# Patient Record
Sex: MALE | Race: OTHER | HISPANIC OR LATINO
[De-identification: names, ages, dates, MRNs, and addresses within clinical notes are randomized per-mention and may not be internally consistent; named-entity substitution may affect disease eponyms.]

---

## 2017-01-10 ENCOUNTER — APPOINTMENT (OUTPATIENT)
Dept: CARDIOLOGY | Facility: CLINIC | Age: 62
End: 2017-01-10

## 2017-01-10 ENCOUNTER — NON-APPOINTMENT (OUTPATIENT)
Age: 62
End: 2017-01-10

## 2017-01-10 VITALS
SYSTOLIC BLOOD PRESSURE: 132 MMHG | HEIGHT: 68 IN | DIASTOLIC BLOOD PRESSURE: 74 MMHG | HEART RATE: 60 BPM | BODY MASS INDEX: 34.86 KG/M2 | WEIGHT: 230 LBS | OXYGEN SATURATION: 94 %

## 2017-01-17 ENCOUNTER — APPOINTMENT (OUTPATIENT)
Dept: PULMONOLOGY | Facility: CLINIC | Age: 62
End: 2017-01-17

## 2017-01-17 VITALS
SYSTOLIC BLOOD PRESSURE: 110 MMHG | OXYGEN SATURATION: 96 % | WEIGHT: 234 LBS | BODY MASS INDEX: 35.58 KG/M2 | DIASTOLIC BLOOD PRESSURE: 72 MMHG | HEART RATE: 76 BPM

## 2017-01-17 RX ORDER — ALBUTEROL SULFATE 90 UG/1
108 (90 BASE) AEROSOL, METERED RESPIRATORY (INHALATION)
Qty: 1 | Refills: 5 | Status: ACTIVE | COMMUNITY
Start: 2017-01-17 | End: 1900-01-01

## 2017-07-13 ENCOUNTER — APPOINTMENT (OUTPATIENT)
Dept: PULMONOLOGY | Facility: CLINIC | Age: 62
End: 2017-07-13

## 2017-07-13 VITALS
SYSTOLIC BLOOD PRESSURE: 118 MMHG | HEIGHT: 68 IN | OXYGEN SATURATION: 94 % | HEART RATE: 68 BPM | WEIGHT: 232 LBS | BODY MASS INDEX: 35.16 KG/M2 | DIASTOLIC BLOOD PRESSURE: 68 MMHG

## 2017-07-13 DIAGNOSIS — E66.01 MORBID (SEVERE) OBESITY DUE TO EXCESS CALORIES: ICD-10-CM

## 2017-08-01 ENCOUNTER — APPOINTMENT (OUTPATIENT)
Dept: CARDIOLOGY | Facility: CLINIC | Age: 62
End: 2017-08-01
Payer: COMMERCIAL

## 2017-08-01 VITALS
SYSTOLIC BLOOD PRESSURE: 129 MMHG | DIASTOLIC BLOOD PRESSURE: 80 MMHG | HEIGHT: 68 IN | OXYGEN SATURATION: 96 % | HEART RATE: 60 BPM

## 2017-08-01 DIAGNOSIS — R53.83 OTHER FATIGUE: ICD-10-CM

## 2017-08-01 PROCEDURE — 99214 OFFICE O/P EST MOD 30 MIN: CPT | Mod: 25

## 2017-08-01 PROCEDURE — 93306 TTE W/DOPPLER COMPLETE: CPT

## 2017-08-01 PROCEDURE — 93000 ELECTROCARDIOGRAM COMPLETE: CPT

## 2017-08-06 ENCOUNTER — NON-APPOINTMENT (OUTPATIENT)
Age: 62
End: 2017-08-06

## 2018-03-02 ENCOUNTER — EMERGENCY (EMERGENCY)
Facility: HOSPITAL | Age: 63
LOS: 1 days | Discharge: HOME | End: 2018-03-02
Attending: EMERGENCY MEDICINE | Admitting: EMERGENCY MEDICINE
Payer: COMMERCIAL

## 2018-03-02 VITALS
TEMPERATURE: 98 F | DIASTOLIC BLOOD PRESSURE: 87 MMHG | RESPIRATION RATE: 16 BRPM | SYSTOLIC BLOOD PRESSURE: 133 MMHG | OXYGEN SATURATION: 95 % | HEART RATE: 93 BPM

## 2018-03-02 VITALS — HEIGHT: 69 IN | WEIGHT: 229.94 LBS

## 2018-03-02 DIAGNOSIS — Z98.890 OTHER SPECIFIED POSTPROCEDURAL STATES: Chronic | ICD-10-CM

## 2018-03-02 LAB
ALBUMIN SERPL ELPH-MCNC: 4.3 G/DL — SIGNIFICANT CHANGE UP (ref 3.3–5.2)
ALP SERPL-CCNC: 53 U/L — SIGNIFICANT CHANGE UP (ref 40–120)
ALT FLD-CCNC: 27 U/L — SIGNIFICANT CHANGE UP
ANION GAP SERPL CALC-SCNC: 13 MMOL/L — SIGNIFICANT CHANGE UP (ref 5–17)
APPEARANCE UR: CLEAR — SIGNIFICANT CHANGE UP
APTT BLD: 26.5 SEC — LOW (ref 27.5–37.4)
AST SERPL-CCNC: 26 U/L — SIGNIFICANT CHANGE UP
BILIRUB SERPL-MCNC: 0.4 MG/DL — SIGNIFICANT CHANGE UP (ref 0.4–2)
BILIRUB UR-MCNC: NEGATIVE — SIGNIFICANT CHANGE UP
BLD GP AB SCN SERPL QL: SIGNIFICANT CHANGE UP
BUN SERPL-MCNC: 14 MG/DL — SIGNIFICANT CHANGE UP (ref 8–20)
CALCIUM SERPL-MCNC: 9.4 MG/DL — SIGNIFICANT CHANGE UP (ref 8.6–10.2)
CHLORIDE SERPL-SCNC: 100 MMOL/L — SIGNIFICANT CHANGE UP (ref 98–107)
CO2 SERPL-SCNC: 24 MMOL/L — SIGNIFICANT CHANGE UP (ref 22–29)
COLOR SPEC: YELLOW — SIGNIFICANT CHANGE UP
COMMENT - URINE: SIGNIFICANT CHANGE UP
CREAT SERPL-MCNC: 0.79 MG/DL — SIGNIFICANT CHANGE UP (ref 0.5–1.3)
DIFF PNL FLD: NEGATIVE — SIGNIFICANT CHANGE UP
GLUCOSE SERPL-MCNC: 140 MG/DL — HIGH (ref 70–115)
GLUCOSE UR QL: NEGATIVE MG/DL — SIGNIFICANT CHANGE UP
HCT VFR BLD CALC: 45.6 % — SIGNIFICANT CHANGE UP (ref 42–52)
HGB BLD-MCNC: 15.7 G/DL — SIGNIFICANT CHANGE UP (ref 14–18)
INR BLD: 1.14 RATIO — SIGNIFICANT CHANGE UP (ref 0.88–1.16)
KETONES UR-MCNC: NEGATIVE — SIGNIFICANT CHANGE UP
LEUKOCYTE ESTERASE UR-ACNC: NEGATIVE — SIGNIFICANT CHANGE UP
LIDOCAIN IGE QN: 38 U/L — SIGNIFICANT CHANGE UP (ref 22–51)
MCHC RBC-ENTMCNC: 29.1 PG — SIGNIFICANT CHANGE UP (ref 27–31)
MCHC RBC-ENTMCNC: 34.4 G/DL — SIGNIFICANT CHANGE UP (ref 32–36)
MCV RBC AUTO: 84.6 FL — SIGNIFICANT CHANGE UP (ref 80–94)
NITRITE UR-MCNC: NEGATIVE — SIGNIFICANT CHANGE UP
PH UR: 8 — SIGNIFICANT CHANGE UP (ref 5–8)
PLATELET # BLD AUTO: 216 K/UL — SIGNIFICANT CHANGE UP (ref 150–400)
POTASSIUM SERPL-MCNC: 4.1 MMOL/L — SIGNIFICANT CHANGE UP (ref 3.5–5.3)
POTASSIUM SERPL-SCNC: 4.1 MMOL/L — SIGNIFICANT CHANGE UP (ref 3.5–5.3)
PROT SERPL-MCNC: 7.7 G/DL — SIGNIFICANT CHANGE UP (ref 6.6–8.7)
PROT UR-MCNC: NEGATIVE MG/DL — SIGNIFICANT CHANGE UP
PROTHROM AB SERPL-ACNC: 12.6 SEC — SIGNIFICANT CHANGE UP (ref 9.8–12.7)
RBC # BLD: 5.39 M/UL — SIGNIFICANT CHANGE UP (ref 4.6–6.2)
RBC # FLD: 12.8 % — SIGNIFICANT CHANGE UP (ref 11–15.6)
SODIUM SERPL-SCNC: 137 MMOL/L — SIGNIFICANT CHANGE UP (ref 135–145)
SP GR SPEC: 1.01 — SIGNIFICANT CHANGE UP (ref 1.01–1.02)
TROPONIN T SERPL-MCNC: <0.01 NG/ML — SIGNIFICANT CHANGE UP (ref 0–0.06)
TYPE + AB SCN PNL BLD: SIGNIFICANT CHANGE UP
UROBILINOGEN FLD QL: NEGATIVE MG/DL — SIGNIFICANT CHANGE UP
WBC # BLD: 13 K/UL — HIGH (ref 4.8–10.8)
WBC # FLD AUTO: 13 K/UL — HIGH (ref 4.8–10.8)
WBC UR QL: SIGNIFICANT CHANGE UP

## 2018-03-02 PROCEDURE — 93010 ELECTROCARDIOGRAM REPORT: CPT

## 2018-03-02 PROCEDURE — 96374 THER/PROPH/DIAG INJ IV PUSH: CPT | Mod: XU

## 2018-03-02 PROCEDURE — 84484 ASSAY OF TROPONIN QUANT: CPT

## 2018-03-02 PROCEDURE — 74174 CTA ABD&PLVS W/CONTRAST: CPT

## 2018-03-02 PROCEDURE — 96375 TX/PRO/DX INJ NEW DRUG ADDON: CPT | Mod: XU

## 2018-03-02 PROCEDURE — 81001 URINALYSIS AUTO W/SCOPE: CPT

## 2018-03-02 PROCEDURE — 71275 CT ANGIOGRAPHY CHEST: CPT | Mod: 26

## 2018-03-02 PROCEDURE — 36415 COLL VENOUS BLD VENIPUNCTURE: CPT

## 2018-03-02 PROCEDURE — 87086 URINE CULTURE/COLONY COUNT: CPT

## 2018-03-02 PROCEDURE — 85730 THROMBOPLASTIN TIME PARTIAL: CPT

## 2018-03-02 PROCEDURE — 74174 CTA ABD&PLVS W/CONTRAST: CPT | Mod: 26

## 2018-03-02 PROCEDURE — 93005 ELECTROCARDIOGRAM TRACING: CPT

## 2018-03-02 PROCEDURE — 99284 EMERGENCY DEPT VISIT MOD MDM: CPT | Mod: 25

## 2018-03-02 PROCEDURE — 71275 CT ANGIOGRAPHY CHEST: CPT

## 2018-03-02 PROCEDURE — 85027 COMPLETE CBC AUTOMATED: CPT

## 2018-03-02 PROCEDURE — 86901 BLOOD TYPING SEROLOGIC RH(D): CPT

## 2018-03-02 PROCEDURE — 83690 ASSAY OF LIPASE: CPT

## 2018-03-02 PROCEDURE — 99285 EMERGENCY DEPT VISIT HI MDM: CPT | Mod: 25

## 2018-03-02 PROCEDURE — 85610 PROTHROMBIN TIME: CPT

## 2018-03-02 PROCEDURE — 86900 BLOOD TYPING SEROLOGIC ABO: CPT

## 2018-03-02 PROCEDURE — 80053 COMPREHEN METABOLIC PANEL: CPT

## 2018-03-02 PROCEDURE — 86850 RBC ANTIBODY SCREEN: CPT

## 2018-03-02 RX ORDER — MORPHINE SULFATE 50 MG/1
4 CAPSULE, EXTENDED RELEASE ORAL ONCE
Qty: 0 | Refills: 0 | Status: DISCONTINUED | OUTPATIENT
Start: 2018-03-02 | End: 2018-03-02

## 2018-03-02 RX ORDER — ONDANSETRON 8 MG/1
4 TABLET, FILM COATED ORAL ONCE
Qty: 0 | Refills: 0 | Status: COMPLETED | OUTPATIENT
Start: 2018-03-02 | End: 2018-03-02

## 2018-03-02 RX ORDER — HYDROMORPHONE HYDROCHLORIDE 2 MG/ML
0.5 INJECTION INTRAMUSCULAR; INTRAVENOUS; SUBCUTANEOUS ONCE
Qty: 0 | Refills: 0 | Status: DISCONTINUED | OUTPATIENT
Start: 2018-03-02 | End: 2018-03-02

## 2018-03-02 RX ORDER — SODIUM CHLORIDE 9 MG/ML
1000 INJECTION INTRAMUSCULAR; INTRAVENOUS; SUBCUTANEOUS ONCE
Qty: 0 | Refills: 0 | Status: COMPLETED | OUTPATIENT
Start: 2018-03-02 | End: 2018-03-02

## 2018-03-02 RX ADMIN — SODIUM CHLORIDE 1000 MILLILITER(S): 9 INJECTION INTRAMUSCULAR; INTRAVENOUS; SUBCUTANEOUS at 08:58

## 2018-03-02 RX ADMIN — MORPHINE SULFATE 4 MILLIGRAM(S): 50 CAPSULE, EXTENDED RELEASE ORAL at 12:36

## 2018-03-02 RX ADMIN — HYDROMORPHONE HYDROCHLORIDE 0.5 MILLIGRAM(S): 2 INJECTION INTRAMUSCULAR; INTRAVENOUS; SUBCUTANEOUS at 16:01

## 2018-03-02 RX ADMIN — MORPHINE SULFATE 4 MILLIGRAM(S): 50 CAPSULE, EXTENDED RELEASE ORAL at 12:49

## 2018-03-02 RX ADMIN — ONDANSETRON 4 MILLIGRAM(S): 8 TABLET, FILM COATED ORAL at 08:59

## 2018-03-02 NOTE — ED PROVIDER NOTE - OBJECTIVE STATEMENT
Patient is a 61 y/o male with a pmhx of asthma, HTN, HLD c/o of abdominal pain that started tonight at 2: 00 A.M. Patient admits pain is sharp and severe. Patient admits pain is radiating to mid back. Patient denies experiencing this before in the past. Patient admits to four episodes of vomiting (non-bloody, non-bilious). Patient denies taking medication for the pain. Patient denies fever, chills, diarrhea, chest pain, shortness of breath, palpitations, dysuria, hematuria.

## 2018-03-02 NOTE — ED ADULT NURSE NOTE - OBJECTIVE STATEMENT
62 yr old male a+ox4 presents to ED c/o epigastric pain radiating to the left abd and flank.  pt also reports n/v.  sx started at approx 0200 this morning.   last BM was 0300 this morning.  pt states he last ate a burrito last night from a restaurant.  pt denies sick contacts, fevers, chills, cp, sob.

## 2018-03-02 NOTE — ED PROVIDER NOTE - PHYSICAL EXAMINATION
General: Well appearing, sitting comfortably in stretcher Eyes: PERRLA, conjunctiva pink, sclera white bilaterally Cardio: Regular rate and rhythm, S1/S2, no murmurs Resp: Clear to auscultation bilaterally, no wheezes, rales or rhonchi Abd: Soft, tenderness in LUQ and RUQ on palpation, + BS : No CVA tenderness MSK: FROM in all extremities Skin: Warm, dry, without rashes

## 2018-03-02 NOTE — ED ADULT NURSE REASSESSMENT NOTE - NS ED NURSE REASSESS COMMENT FT1
Patient continues to rest in bed at this time with eyes closed, respirations are even and non labored. NAD Noted.

## 2018-03-02 NOTE — ED PROVIDER NOTE - MEDICAL DECISION MAKING DETAILS
The patient presents with L flank pain that radiates to epigastric and Lab and CT and EKG negative and the patient feels better with good PO intake.  Will DC home and f/u with PMD

## 2018-03-02 NOTE — ED ADULT NURSE REASSESSMENT NOTE - NS ED NURSE REASSESS COMMENT FT1
CT unable to complete exam, additional IV access needed. #20g placed to left upper arm, pt medicated per MD orders and transported to CT at this time. Pt in no apparent distress, respirations even and unlabored. Will continue to monitor and reassess.

## 2018-03-02 NOTE — ED PROVIDER NOTE - PROGRESS NOTE DETAILS
Patient signed out to dr. glass The patient appears well and eating well. No chest pain no abdominal pain currently. CT chest and abd negative.  Will DC home and f/u with PMD

## 2018-03-02 NOTE — ED PROVIDER NOTE - ATTENDING CONTRIBUTION TO CARE
Patient complaining of L flank pain that radiates to epigastric area. No CP, No SOB, No motor No sensory loss.  ABD soft, NT, Mild L flank TTP. CT negative.  Abdominal Pain NOS  I, Manuel Weiss, performed the initial face to face bedside interview with this patient regarding history of present illness, review of symptoms and relevant past medical, social and family history.  I completed an independent physical examination.  I was the initial provider who evaluated this patient. I have signed out the follow up of any pending tests (i.e. labs, radiological studies) to the ACP.  I have communicated the patient’s plan of care and disposition with the ACP.

## 2018-03-03 LAB
CULTURE RESULTS: NO GROWTH — SIGNIFICANT CHANGE UP
SPECIMEN SOURCE: SIGNIFICANT CHANGE UP

## 2018-04-13 ENCOUNTER — APPOINTMENT (OUTPATIENT)
Dept: CARDIOLOGY | Facility: CLINIC | Age: 63
End: 2018-04-13
Payer: COMMERCIAL

## 2018-04-13 ENCOUNTER — NON-APPOINTMENT (OUTPATIENT)
Age: 63
End: 2018-04-13

## 2018-04-13 VITALS
HEART RATE: 54 BPM | SYSTOLIC BLOOD PRESSURE: 127 MMHG | WEIGHT: 225 LBS | BODY MASS INDEX: 34.21 KG/M2 | OXYGEN SATURATION: 96 % | DIASTOLIC BLOOD PRESSURE: 76 MMHG

## 2018-04-13 DIAGNOSIS — E78.00 PURE HYPERCHOLESTEROLEMIA, UNSPECIFIED: ICD-10-CM

## 2018-04-13 DIAGNOSIS — R00.1 BRADYCARDIA, UNSPECIFIED: ICD-10-CM

## 2018-04-13 PROCEDURE — 99214 OFFICE O/P EST MOD 30 MIN: CPT

## 2018-04-13 PROCEDURE — 93000 ELECTROCARDIOGRAM COMPLETE: CPT

## 2018-05-11 ENCOUNTER — INPATIENT (INPATIENT)
Facility: HOSPITAL | Age: 63
LOS: 2 days | Discharge: HOME | DRG: 392 | End: 2018-05-14
Attending: INTERNAL MEDICINE | Admitting: HOSPITALIST
Payer: COMMERCIAL

## 2018-05-11 VITALS
DIASTOLIC BLOOD PRESSURE: 84 MMHG | SYSTOLIC BLOOD PRESSURE: 148 MMHG | OXYGEN SATURATION: 99 % | RESPIRATION RATE: 18 BRPM | HEART RATE: 60 BPM | TEMPERATURE: 98 F

## 2018-05-11 DIAGNOSIS — I10 ESSENTIAL (PRIMARY) HYPERTENSION: ICD-10-CM

## 2018-05-11 DIAGNOSIS — Z98.890 OTHER SPECIFIED POSTPROCEDURAL STATES: Chronic | ICD-10-CM

## 2018-05-11 DIAGNOSIS — R10.9 UNSPECIFIED ABDOMINAL PAIN: ICD-10-CM

## 2018-05-11 DIAGNOSIS — K80.51 CALCULUS OF BILE DUCT WITHOUT CHOLANGITIS OR CHOLECYSTITIS WITH OBSTRUCTION: ICD-10-CM

## 2018-05-11 DIAGNOSIS — R10.13 EPIGASTRIC PAIN: ICD-10-CM

## 2018-05-11 DIAGNOSIS — G47.33 OBSTRUCTIVE SLEEP APNEA (ADULT) (PEDIATRIC): ICD-10-CM

## 2018-05-11 LAB
APPEARANCE UR: CLEAR — SIGNIFICANT CHANGE UP
BASOPHILS # BLD AUTO: 0 K/UL — SIGNIFICANT CHANGE UP (ref 0–0.2)
BASOPHILS NFR BLD AUTO: 0.3 % — SIGNIFICANT CHANGE UP (ref 0–2)
BILIRUB UR-MCNC: NEGATIVE — SIGNIFICANT CHANGE UP
COLOR SPEC: YELLOW — SIGNIFICANT CHANGE UP
DIFF PNL FLD: NEGATIVE — SIGNIFICANT CHANGE UP
EOSINOPHIL # BLD AUTO: 0 K/UL — SIGNIFICANT CHANGE UP (ref 0–0.5)
EOSINOPHIL NFR BLD AUTO: 0 % — SIGNIFICANT CHANGE UP (ref 0–5)
GLUCOSE UR QL: NEGATIVE MG/DL — SIGNIFICANT CHANGE UP
HCT VFR BLD CALC: 45.6 % — SIGNIFICANT CHANGE UP (ref 42–52)
HGB BLD-MCNC: 15.4 G/DL — SIGNIFICANT CHANGE UP (ref 14–18)
KETONES UR-MCNC: NEGATIVE — SIGNIFICANT CHANGE UP
LEUKOCYTE ESTERASE UR-ACNC: NEGATIVE — SIGNIFICANT CHANGE UP
LYMPHOCYTES # BLD AUTO: 0.9 K/UL — LOW (ref 1–4.8)
LYMPHOCYTES # BLD AUTO: 7.9 % — LOW (ref 20–55)
MCHC RBC-ENTMCNC: 29.1 PG — SIGNIFICANT CHANGE UP (ref 27–31)
MCHC RBC-ENTMCNC: 33.8 G/DL — SIGNIFICANT CHANGE UP (ref 32–36)
MCV RBC AUTO: 86 FL — SIGNIFICANT CHANGE UP (ref 80–94)
MONOCYTES # BLD AUTO: 0.6 K/UL — SIGNIFICANT CHANGE UP (ref 0–0.8)
MONOCYTES NFR BLD AUTO: 5.2 % — SIGNIFICANT CHANGE UP (ref 3–10)
NEUTROPHILS # BLD AUTO: 10.2 K/UL — HIGH (ref 1.8–8)
NEUTROPHILS NFR BLD AUTO: 86.1 % — HIGH (ref 37–73)
NITRITE UR-MCNC: NEGATIVE — SIGNIFICANT CHANGE UP
PH UR: 7 — SIGNIFICANT CHANGE UP (ref 5–8)
PLATELET # BLD AUTO: 248 K/UL — SIGNIFICANT CHANGE UP (ref 150–400)
PROT UR-MCNC: NEGATIVE MG/DL — SIGNIFICANT CHANGE UP
RBC # BLD: 5.3 M/UL — SIGNIFICANT CHANGE UP (ref 4.6–6.2)
RBC # FLD: 13.4 % — SIGNIFICANT CHANGE UP (ref 11–15.6)
SP GR SPEC: 1.01 — SIGNIFICANT CHANGE UP (ref 1.01–1.02)
UROBILINOGEN FLD QL: NEGATIVE MG/DL — SIGNIFICANT CHANGE UP
WBC # BLD: 11.8 K/UL — HIGH (ref 4.8–10.8)
WBC # FLD AUTO: 11.8 K/UL — HIGH (ref 4.8–10.8)

## 2018-05-11 PROCEDURE — 99254 IP/OBS CNSLTJ NEW/EST MOD 60: CPT

## 2018-05-11 PROCEDURE — 99285 EMERGENCY DEPT VISIT HI MDM: CPT | Mod: 25

## 2018-05-11 PROCEDURE — 74177 CT ABD & PELVIS W/CONTRAST: CPT | Mod: 26

## 2018-05-11 PROCEDURE — 74022 RADEX COMPL AQT ABD SERIES: CPT | Mod: 26

## 2018-05-11 PROCEDURE — 76705 ECHO EXAM OF ABDOMEN: CPT | Mod: 26

## 2018-05-11 PROCEDURE — 99223 1ST HOSP IP/OBS HIGH 75: CPT

## 2018-05-11 RX ORDER — ENOXAPARIN SODIUM 100 MG/ML
40 INJECTION SUBCUTANEOUS EVERY 24 HOURS
Qty: 0 | Refills: 0 | Status: DISCONTINUED | OUTPATIENT
Start: 2018-05-11 | End: 2018-05-14

## 2018-05-11 RX ORDER — MONTELUKAST 4 MG/1
1 TABLET, CHEWABLE ORAL
Qty: 0 | Refills: 0 | COMMUNITY

## 2018-05-11 RX ORDER — SODIUM CHLORIDE 9 MG/ML
1000 INJECTION, SOLUTION INTRAVENOUS
Qty: 0 | Refills: 0 | Status: DISCONTINUED | OUTPATIENT
Start: 2018-05-11 | End: 2018-05-13

## 2018-05-11 RX ORDER — PANTOPRAZOLE SODIUM 20 MG/1
40 TABLET, DELAYED RELEASE ORAL ONCE
Qty: 0 | Refills: 0 | Status: COMPLETED | OUTPATIENT
Start: 2018-05-11 | End: 2018-05-11

## 2018-05-11 RX ORDER — THEOPHYLLINE ANHYDROUS 200 MG
1 TABLET, EXTENDED RELEASE 12 HR ORAL
Qty: 0 | Refills: 0 | COMMUNITY

## 2018-05-11 RX ORDER — HYDROMORPHONE HYDROCHLORIDE 2 MG/ML
1 INJECTION INTRAMUSCULAR; INTRAVENOUS; SUBCUTANEOUS ONCE
Qty: 0 | Refills: 0 | Status: DISCONTINUED | OUTPATIENT
Start: 2018-05-11 | End: 2018-05-11

## 2018-05-11 RX ORDER — ONDANSETRON 8 MG/1
4 TABLET, FILM COATED ORAL ONCE
Qty: 0 | Refills: 0 | Status: COMPLETED | OUTPATIENT
Start: 2018-05-11 | End: 2018-05-11

## 2018-05-11 RX ORDER — SODIUM CHLORIDE 9 MG/ML
500 INJECTION INTRAMUSCULAR; INTRAVENOUS; SUBCUTANEOUS ONCE
Qty: 0 | Refills: 0 | Status: COMPLETED | OUTPATIENT
Start: 2018-05-11 | End: 2018-05-11

## 2018-05-11 RX ORDER — LOSARTAN POTASSIUM 100 MG/1
1 TABLET, FILM COATED ORAL
Qty: 0 | Refills: 0 | COMMUNITY

## 2018-05-11 RX ORDER — HYDROMORPHONE HYDROCHLORIDE 2 MG/ML
0.5 INJECTION INTRAMUSCULAR; INTRAVENOUS; SUBCUTANEOUS EVERY 4 HOURS
Qty: 0 | Refills: 0 | Status: DISCONTINUED | OUTPATIENT
Start: 2018-05-11 | End: 2018-05-14

## 2018-05-11 RX ORDER — THEOPHYLLINE ANHYDROUS 200 MG
200 TABLET, EXTENDED RELEASE 12 HR ORAL DAILY
Qty: 0 | Refills: 0 | Status: DISCONTINUED | OUTPATIENT
Start: 2018-05-11 | End: 2018-05-14

## 2018-05-11 RX ORDER — MONTELUKAST 4 MG/1
10 TABLET, CHEWABLE ORAL DAILY
Qty: 0 | Refills: 0 | Status: DISCONTINUED | OUTPATIENT
Start: 2018-05-11 | End: 2018-05-14

## 2018-05-11 RX ORDER — LOSARTAN POTASSIUM 100 MG/1
100 TABLET, FILM COATED ORAL DAILY
Qty: 0 | Refills: 0 | Status: DISCONTINUED | OUTPATIENT
Start: 2018-05-11 | End: 2018-05-14

## 2018-05-11 RX ORDER — AMLODIPINE BESYLATE 2.5 MG/1
5 TABLET ORAL DAILY
Qty: 0 | Refills: 0 | Status: DISCONTINUED | OUTPATIENT
Start: 2018-05-11 | End: 2018-05-14

## 2018-05-11 RX ORDER — PANTOPRAZOLE SODIUM 20 MG/1
40 TABLET, DELAYED RELEASE ORAL
Qty: 0 | Refills: 0 | Status: DISCONTINUED | OUTPATIENT
Start: 2018-05-11 | End: 2018-05-14

## 2018-05-11 RX ORDER — DIPHENHYDRAMINE HCL 50 MG
25 CAPSULE ORAL ONCE
Qty: 0 | Refills: 0 | Status: COMPLETED | OUTPATIENT
Start: 2018-05-11 | End: 2018-05-11

## 2018-05-11 RX ORDER — AMLODIPINE BESYLATE 2.5 MG/1
1 TABLET ORAL
Qty: 0 | Refills: 0 | COMMUNITY

## 2018-05-11 RX ORDER — HYDROMORPHONE HYDROCHLORIDE 2 MG/ML
0.5 INJECTION INTRAMUSCULAR; INTRAVENOUS; SUBCUTANEOUS ONCE
Qty: 0 | Refills: 0 | Status: DISCONTINUED | OUTPATIENT
Start: 2018-05-11 | End: 2018-05-11

## 2018-05-11 RX ADMIN — Medication 125 MILLIGRAM(S): at 16:20

## 2018-05-11 RX ADMIN — PANTOPRAZOLE SODIUM 40 MILLIGRAM(S): 20 TABLET, DELAYED RELEASE ORAL at 07:51

## 2018-05-11 RX ADMIN — HYDROMORPHONE HYDROCHLORIDE 1 MILLIGRAM(S): 2 INJECTION INTRAMUSCULAR; INTRAVENOUS; SUBCUTANEOUS at 11:00

## 2018-05-11 RX ADMIN — Medication 25 MILLIGRAM(S): at 16:20

## 2018-05-11 RX ADMIN — Medication 200 MILLIGRAM(S): at 22:53

## 2018-05-11 RX ADMIN — HYDROMORPHONE HYDROCHLORIDE 1 MILLIGRAM(S): 2 INJECTION INTRAMUSCULAR; INTRAVENOUS; SUBCUTANEOUS at 12:10

## 2018-05-11 RX ADMIN — SODIUM CHLORIDE 125 MILLILITER(S): 9 INJECTION, SOLUTION INTRAVENOUS at 21:10

## 2018-05-11 RX ADMIN — ONDANSETRON 4 MILLIGRAM(S): 8 TABLET, FILM COATED ORAL at 07:52

## 2018-05-11 RX ADMIN — ENOXAPARIN SODIUM 40 MILLIGRAM(S): 100 INJECTION SUBCUTANEOUS at 21:30

## 2018-05-11 RX ADMIN — HYDROMORPHONE HYDROCHLORIDE 1 MILLIGRAM(S): 2 INJECTION INTRAMUSCULAR; INTRAVENOUS; SUBCUTANEOUS at 14:50

## 2018-05-11 RX ADMIN — HYDROMORPHONE HYDROCHLORIDE 0.5 MILLIGRAM(S): 2 INJECTION INTRAMUSCULAR; INTRAVENOUS; SUBCUTANEOUS at 10:27

## 2018-05-11 RX ADMIN — HYDROMORPHONE HYDROCHLORIDE 1 MILLIGRAM(S): 2 INJECTION INTRAMUSCULAR; INTRAVENOUS; SUBCUTANEOUS at 13:17

## 2018-05-11 RX ADMIN — HYDROMORPHONE HYDROCHLORIDE 0.5 MILLIGRAM(S): 2 INJECTION INTRAMUSCULAR; INTRAVENOUS; SUBCUTANEOUS at 07:52

## 2018-05-11 RX ADMIN — LOSARTAN POTASSIUM 100 MILLIGRAM(S): 100 TABLET, FILM COATED ORAL at 22:53

## 2018-05-11 RX ADMIN — AMLODIPINE BESYLATE 5 MILLIGRAM(S): 2.5 TABLET ORAL at 22:52

## 2018-05-11 RX ADMIN — MONTELUKAST 10 MILLIGRAM(S): 4 TABLET, CHEWABLE ORAL at 22:53

## 2018-05-11 NOTE — CONSULT NOTE ADULT - ASSESSMENT
Patient with abdominal pain. Unclear etiology. Cholelithiasis. Unlikely cholecystitis.    1. Start PPI therapy bid  2. May need EGD if does not improve  3. GI will follow up

## 2018-05-11 NOTE — H&P ADULT - HISTORY OF PRESENT ILLNESS
63 yr male with history of hypertension.  Presented sudden onset abdominal pains 1am associated with nausea vomiting. Patient took mylanta to no avail. In the morning 6am pain unremitting, localized in the epigastrium, patient came over to ED.  CT abd showing diffuse fatty infiltrates of the liver, diverticulosis without diverticulitis.  Abd ultrasound cholelithiasis without cholecystitis.  Leukocytosis.

## 2018-05-11 NOTE — ED ADULT NURSE NOTE - OBJECTIVE STATEMENT
patient is alert, report abdominal pain, epigastric and upper abdomen, vomiting. onset 1am this morning. Patient was here for similar issue 1 month ago.

## 2018-05-11 NOTE — CONSULT NOTE ADULT - SUBJECTIVE AND OBJECTIVE BOX
HPI:      PAST MEDICAL & SURGICAL HISTORY:  Sleep apnea  Aortic embolism or thrombosis  HTN (hypertension)  H/O cervical spine surgery      ROS:  No Heartburn, regurgitation, dysphagia, odynophagia.  No dyspepsia  No abdominal pain.    No Nausea, vomiting.  No Bleeding.  No hematemesis.   No diarrhea.    No hematochesia.  No weight loss, anorexia.  No edema.      MEDICATIONS  (STANDING):  HYDROmorphone  Injectable 1 milliGRAM(s) IV Push Once    MEDICATIONS  (PRN):      Allergies    Allergy Status Unknown    Intolerances        SOCIAL HISTORY:    ENDOSCOPIC/GI HISTORY:    FAMILY HISTORY:      Vital Signs Last 24 Hrs  T(C): 36.5 (11 May 2018 12:10), Max: 36.5 (11 May 2018 12:10)  T(F): 97.7 (11 May 2018 12:10), Max: 97.7 (11 May 2018 12:10)  HR: 75 (11 May 2018 12:10) (60 - 75)  BP: 136/76 (11 May 2018 12:10) (136/76 - 148/84)  BP(mean): --  RR: 18 (11 May 2018 12:10) (18 - 18)  SpO2: 99% (11 May 2018 12:10) (99% - 99%)    PHYSICAL EXAM:    GENERAL: NAD, well-groomed, well-developed  HEAD:  Atraumatic, Normocephalic  EYES: EOMI, PERRLA, conjunctiva and sclera clear  ENMT: No tonsillar erythema, exudates, or enlargement; Moist mucous membranes, Good dentition, No lesions  NECK: Supple, No JVD, Normal thyroid  CHEST/LUNG: Clear to percussion bilaterally; No rales, rhonchi, wheezing, or rubs  HEART: Regular rate and rhythm; No murmurs, rubs, or gallops  ABDOMEN: Soft, Nontender, Nondistended; Bowel sounds present  EXTREMITIES:  2+ Peripheral Pulses, No clubbing, cyanosis, or edema  LYMPH: No lymphadenopathy noted  SKIN: No rashes or lesions      LABS:                        15.4   11.8  )-----------( 248      ( 11 May 2018 07:56 )             45.6     05-11    141  |  104  |  15.0  ----------------------------<  149<H>  3.8   |  24.0  |  0.76    Ca    9.0      11 May 2018 08:57    TPro  7.5  /  Alb  4.3  /  TBili  0.5  /  DBili  x   /  AST  24  /  ALT  28  /  AlkPhos  51  05-11       Urinalysis Basic - ( 11 May 2018 10:51 )    Color: Yellow / Appearance: Clear / S.010 / pH: x  Gluc: x / Ketone: Negative  / Bili: Negative / Urobili: Negative mg/dL   Blood: x / Protein: Negative mg/dL / Nitrite: Negative   Leuk Esterase: Negative / RBC: x / WBC x   Sq Epi: x / Non Sq Epi: x / Bacteria: x        LIVER FUNCTIONS - ( 11 May 2018 08:57 )  Alb: 4.3 g/dL / Pro: 7.5 g/dL / ALK PHOS: 51 U/L / ALT: 28 U/L / AST: 24 U/L / GGT: x               RADIOLOGY & ADDITIONAL STUDIES:< from: US Gallbladder (18 @ 09:48) >      INTERPRETATION:  INDICATIONS:  Right upper quadrant pain.  TECHNIQUE:  The examination was performed with the real time apparatus   with color flow and spectral doppler imaging.    Examination limited to   the region of the gallbladder.  DATE AND TIME OF EXAM: 2018 9:28 AM.    COMPARISON EXAMINATION: No prior exam.    FINDINGS:    Gallbladder:  Cholelithiasis. No evidence of gallbladder dilatation..  Gallbladder Walls:  No evidence of thickening or edema.  Common Bile Duct:  Normal, measuring 3 mm.      IMPRESSION: Cholelithiasis without sonographic evidence of acute   cholecystitis..        < end of copied text > HPI:Patient with history of CAD, HTN, aortic aneursym, C spine surgery admitted with abdominal pain, nausea, and vomiting. He had similar abdominal pain in 2018 and underwent lab testing and also CT angio which was unremarkable. He woke up suddenly at 4 am and had epigastric pain, nausea, vomiting. No other complaints. No history of constipation. No rectal bleeding. No recent EGD or colonoscopy-about 4 year ago-normal. Not on any NSAIDs except aspirin.     Noted to have cholelithiasis. LFTs normal.       PAST MEDICAL & SURGICAL HISTORY:  Sleep apnea  Aortic embolism or thrombosis  HTN (hypertension)  H/O cervical spine surgery      ROS:  No Heartburn, regurgitation, dysphagia, odynophagia.  No dyspepsia  + abdominal pain.    + Nausea, vomiting.  No Bleeding.  No hematemesis.   No diarrhea.    No hematochezia  No weight loss, anorexia.  No edema.      MEDICATIONS  (STANDING):    MEDICATIONS  (PRN):      Allergies    Allergy Status Unknown    Intolerances        SOCIAL HISTORY:Denies x 3    ENDOSCOPIC/GI HISTORY: As in HPI    FAMILY HISTORY: No GI cancers      Vital Signs Last 24 Hrs  T(C): 36.1 (11 May 2018 18:59), Max: 36.5 (11 May 2018 12:10)  T(F): 97 (11 May 2018 18:59), Max: 97.7 (11 May 2018 12:10)  HR: 73 (11 May 2018 18:59) (60 - 75)  BP: 151/82 (11 May 2018 18:59) (136/76 - 151/82)  BP(mean): --  RR: 18 (11 May 2018 18:59) (18 - 18)  SpO2: 97% (11 May 2018 18:59) (97% - 99%)    PHYSICAL EXAM:    GENERAL: NAD, well-groomed, well-developed  HEAD:  Atraumatic, Normocephalic  EYES: EOMI, PERRLA, conjunctiva and sclera clear  ENMT: No tonsillar erythema, exudates, or enlargement; Moist mucous membranes, Good dentition, No lesions  NECK: Supple, No JVD, Normal thyroid  CHEST/LUNG: Clear to percussion bilaterally; No rales, rhonchi, wheezing, or rubs  HEART: Regular rate and rhythm; No murmurs, rubs, or gallops  ABDOMEN: Soft, Nontender, Nondistended; Bowel sounds present  RECTAL: No blood  EXTREMITIES:  2+ Peripheral Pulses, No clubbing, cyanosis, or edema  LYMPH: No lymphadenopathy noted  SKIN: No rashes or lesions      LABS:                        15.4   11.8  )-----------( 248      ( 11 May 2018 07:56 )             45.6         141  |  104  |  15.0  ----------------------------<  149<H>  3.8   |  24.0  |  0.76    Ca    9.0      11 May 2018 08:57    TPro  7.5  /  Alb  4.3  /  TBili  0.5  /  DBili  x   /  AST  24  /  ALT  28  /  AlkPhos  51  0511       Urinalysis Basic - ( 11 May 2018 10:51 )    Color: Yellow / Appearance: Clear / S.010 / pH: x  Gluc: x / Ketone: Negative  / Bili: Negative / Urobili: Negative mg/dL   Blood: x / Protein: Negative mg/dL / Nitrite: Negative   Leuk Esterase: Negative / RBC: x / WBC x   Sq Epi: x / Non Sq Epi: x / Bacteria: x    Lipase, Serum (18 @ 08:57)    Lipase, Serum: 41 U/L        LIVER FUNCTIONS - ( 11 May 2018 08:57 )  Alb: 4.3 g/dL / Pro: 7.5 g/dL / ALK PHOS: 51 U/L / ALT: 28 U/L / AST: 24 U/L / GGT: x               RADIOLOGY & ADDITIONAL STUDIES:  < from: CT Abdomen and Pelvis w/ Oral Cont and w/ IV Cont (18 @ 15:19) >    TECHNIQUE:  Sections were obtained from the diaphragm to the pubic   symphysis following oral and intravenous contrast.     95 mls of   omnipaque 300 was administered intravenously without complication.    COMPARISON EXAMINATION:  No prior exam.      FINDINGS:  LUNG BASES:  Unremarkable.   LIVER:  Diffuse fatty infiltration. The gallbladder is unremarkable..  SPLEEN:   Unremarkable.  PANCREAS:  The pancreatic contour is unremarkable without evidence of   mass, inflammation or ductal dilatation..  ADRENAL GLANDS:   Unremarkable.  KIDNEYS:   3 mm right lower pole stone. No evidence of right   hydronephrosis. The left kidney is unremarkable..  AORTA:   Unremarkable.  RETROPERITONEUM:  Under marked.  PELVIC SIDE WALL:   Unremarkable.  GASTROINTESTINAL STRUCTURES:  Colonic diverticulosis. The appendix is   visualized and is normal..  BLADDER:   Unremarkable.  PELVIC ORGANS:   Unremarkable.  BONES:  Degenerative changes.  MISCELLANEOUS: Small umbilical hernia.    IMPRESSION:     Diffuse fatty infiltration of the liver.    3 mm right lower pole renal stone without evidence of hydronephrosis.    Colonic diverticulosis without evidence of diverticulitis.    Small umbilical hernia..     < end of copied text >

## 2018-05-11 NOTE — ED PROVIDER NOTE - OBJECTIVE STATEMENT
64 y/o M pt with a pmhx asthma , htn and aortic thrombosis presents to the ED c/o nausea, vomiting and abdominal pain that onset at 0100 this AM. Localizes pain to the upper abdomen. Rates the pain 10/10.  Last food intake was pasta with "stewed" pork. Came to [facility] ED 2 months ago for similar symptoms where he had a negative CT of chest and abdomen. Did not f/u because the pain subsided and the symptoms did not return. Describes the pain this event similar to his event in the past. Denies cough, sob, Hx of ulcers, back pain, diarrhea, fever, chills, headache, dysuria/frequency/urgency, neck pain, chest pain, recent travel, recent trauma, rash or any other complaints. NKDA.

## 2018-05-11 NOTE — ED PROVIDER NOTE - PROGRESS NOTE DETAILS
pt has worsening pain in upper abdomen. GI consult called. PT TI HAVE REPEAT ct scan pt had mild allergic reaction to dilaudid and was given benadryl and solumedrol and will be admitted for further work-up

## 2018-05-11 NOTE — ED ADULT NURSE REASSESSMENT NOTE - NS ED NURSE REASSESS COMMENT FT1
Report received from offgoing rn, chart as noted, pt a&ox3 #20g iv noted to right ac, site asmyp, rr even and unlabored. Pt reports pain 4/10, relaxation promoted for pain control, pt practicing prayer for pain relief with family @ bedside. VSS per fs, safety maintained, updated on poc, verbalized understanding of npo @ midnight status, in no apparent distress @ this time

## 2018-05-11 NOTE — H&P ADULT - NSHPPHYSICALEXAM_GEN_ALL_CORE
Well nourished obese  Normocephalic  EOMI PERRL  Neck supple no JVD  S1 and S2 regular   Chest CTA B  Abd soft mild epigastric tenderness. NO rebound. No palpable masses  No calf tenderness , no pedal edema.   AAO X 3 no focal neurologic deficit   No skin rash , no skin eruption  Normal mood and affect

## 2018-05-11 NOTE — ED PROVIDER NOTE - MEDICAL DECISION MAKING DETAILS
Pt with possible gall stones vs gastritis, will give protonic, zofran, pain medications. Obtain Us, xrays and EKG.

## 2018-05-12 LAB
ANION GAP SERPL CALC-SCNC: 15 MMOL/L — SIGNIFICANT CHANGE UP (ref 5–17)
BUN SERPL-MCNC: 12 MG/DL — SIGNIFICANT CHANGE UP (ref 8–20)
CALCIUM SERPL-MCNC: 8.9 MG/DL — SIGNIFICANT CHANGE UP (ref 8.6–10.2)
CHLORIDE SERPL-SCNC: 101 MMOL/L — SIGNIFICANT CHANGE UP (ref 98–107)
CO2 SERPL-SCNC: 23 MMOL/L — SIGNIFICANT CHANGE UP (ref 22–29)
CREAT SERPL-MCNC: 0.74 MG/DL — SIGNIFICANT CHANGE UP (ref 0.5–1.3)
CULTURE RESULTS: SIGNIFICANT CHANGE UP
GLUCOSE SERPL-MCNC: 134 MG/DL — HIGH (ref 70–115)
HCT VFR BLD CALC: 43 % — SIGNIFICANT CHANGE UP (ref 42–52)
HGB BLD-MCNC: 15 G/DL — SIGNIFICANT CHANGE UP (ref 14–18)
MCHC RBC-ENTMCNC: 29.7 PG — SIGNIFICANT CHANGE UP (ref 27–31)
MCHC RBC-ENTMCNC: 34.9 G/DL — SIGNIFICANT CHANGE UP (ref 32–36)
MCV RBC AUTO: 85.1 FL — SIGNIFICANT CHANGE UP (ref 80–94)
PLATELET # BLD AUTO: 225 K/UL — SIGNIFICANT CHANGE UP (ref 150–400)
POTASSIUM SERPL-MCNC: 4.3 MMOL/L — SIGNIFICANT CHANGE UP (ref 3.5–5.3)
POTASSIUM SERPL-SCNC: 4.3 MMOL/L — SIGNIFICANT CHANGE UP (ref 3.5–5.3)
RBC # BLD: 5.05 M/UL — SIGNIFICANT CHANGE UP (ref 4.6–6.2)
RBC # FLD: 13.1 % — SIGNIFICANT CHANGE UP (ref 11–15.6)
SODIUM SERPL-SCNC: 139 MMOL/L — SIGNIFICANT CHANGE UP (ref 135–145)
SPECIMEN SOURCE: SIGNIFICANT CHANGE UP
WBC # BLD: 15.5 K/UL — HIGH (ref 4.8–10.8)
WBC # FLD AUTO: 15.5 K/UL — HIGH (ref 4.8–10.8)

## 2018-05-12 PROCEDURE — 99232 SBSQ HOSP IP/OBS MODERATE 35: CPT

## 2018-05-12 RX ADMIN — AMLODIPINE BESYLATE 5 MILLIGRAM(S): 2.5 TABLET ORAL at 21:14

## 2018-05-12 RX ADMIN — ENOXAPARIN SODIUM 40 MILLIGRAM(S): 100 INJECTION SUBCUTANEOUS at 21:14

## 2018-05-12 RX ADMIN — PANTOPRAZOLE SODIUM 40 MILLIGRAM(S): 20 TABLET, DELAYED RELEASE ORAL at 06:22

## 2018-05-12 RX ADMIN — Medication 200 MILLIGRAM(S): at 21:14

## 2018-05-12 RX ADMIN — LOSARTAN POTASSIUM 100 MILLIGRAM(S): 100 TABLET, FILM COATED ORAL at 21:14

## 2018-05-12 RX ADMIN — PANTOPRAZOLE SODIUM 40 MILLIGRAM(S): 20 TABLET, DELAYED RELEASE ORAL at 18:10

## 2018-05-12 RX ADMIN — MONTELUKAST 10 MILLIGRAM(S): 4 TABLET, CHEWABLE ORAL at 21:14

## 2018-05-12 RX ADMIN — SODIUM CHLORIDE 125 MILLILITER(S): 9 INJECTION, SOLUTION INTRAVENOUS at 23:33

## 2018-05-12 RX ADMIN — SODIUM CHLORIDE 125 MILLILITER(S): 9 INJECTION, SOLUTION INTRAVENOUS at 05:00

## 2018-05-13 ENCOUNTER — TRANSCRIPTION ENCOUNTER (OUTPATIENT)
Age: 63
End: 2018-05-13

## 2018-05-13 LAB
ALBUMIN SERPL ELPH-MCNC: 4 G/DL — SIGNIFICANT CHANGE UP (ref 3.3–5.2)
ALP SERPL-CCNC: 48 U/L — SIGNIFICANT CHANGE UP (ref 40–120)
ALT FLD-CCNC: 28 U/L — SIGNIFICANT CHANGE UP
ANION GAP SERPL CALC-SCNC: 13 MMOL/L — SIGNIFICANT CHANGE UP (ref 5–17)
AST SERPL-CCNC: 26 U/L — SIGNIFICANT CHANGE UP
BILIRUB SERPL-MCNC: 0.5 MG/DL — SIGNIFICANT CHANGE UP (ref 0.4–2)
BUN SERPL-MCNC: 16 MG/DL — SIGNIFICANT CHANGE UP (ref 8–20)
CALCIUM SERPL-MCNC: 8.6 MG/DL — SIGNIFICANT CHANGE UP (ref 8.6–10.2)
CHLORIDE SERPL-SCNC: 101 MMOL/L — SIGNIFICANT CHANGE UP (ref 98–107)
CO2 SERPL-SCNC: 24 MMOL/L — SIGNIFICANT CHANGE UP (ref 22–29)
CREAT SERPL-MCNC: 0.77 MG/DL — SIGNIFICANT CHANGE UP (ref 0.5–1.3)
GLUCOSE SERPL-MCNC: 97 MG/DL — SIGNIFICANT CHANGE UP (ref 70–115)
HCT VFR BLD CALC: 44.5 % — SIGNIFICANT CHANGE UP (ref 42–52)
HGB BLD-MCNC: 14.9 G/DL — SIGNIFICANT CHANGE UP (ref 14–18)
MCHC RBC-ENTMCNC: 28.4 PG — SIGNIFICANT CHANGE UP (ref 27–31)
MCHC RBC-ENTMCNC: 33.5 G/DL — SIGNIFICANT CHANGE UP (ref 32–36)
MCV RBC AUTO: 84.9 FL — SIGNIFICANT CHANGE UP (ref 80–94)
PLATELET # BLD AUTO: 239 K/UL — SIGNIFICANT CHANGE UP (ref 150–400)
POTASSIUM SERPL-MCNC: 4.1 MMOL/L — SIGNIFICANT CHANGE UP (ref 3.5–5.3)
POTASSIUM SERPL-SCNC: 4.1 MMOL/L — SIGNIFICANT CHANGE UP (ref 3.5–5.3)
PROT SERPL-MCNC: 7 G/DL — SIGNIFICANT CHANGE UP (ref 6.6–8.7)
RBC # BLD: 5.24 M/UL — SIGNIFICANT CHANGE UP (ref 4.6–6.2)
RBC # FLD: 13.4 % — SIGNIFICANT CHANGE UP (ref 11–15.6)
SODIUM SERPL-SCNC: 138 MMOL/L — SIGNIFICANT CHANGE UP (ref 135–145)
WBC # BLD: 11.2 K/UL — HIGH (ref 4.8–10.8)
WBC # FLD AUTO: 11.2 K/UL — HIGH (ref 4.8–10.8)

## 2018-05-13 PROCEDURE — 99232 SBSQ HOSP IP/OBS MODERATE 35: CPT

## 2018-05-13 RX ADMIN — MONTELUKAST 10 MILLIGRAM(S): 4 TABLET, CHEWABLE ORAL at 21:13

## 2018-05-13 RX ADMIN — AMLODIPINE BESYLATE 5 MILLIGRAM(S): 2.5 TABLET ORAL at 21:13

## 2018-05-13 RX ADMIN — PANTOPRAZOLE SODIUM 40 MILLIGRAM(S): 20 TABLET, DELAYED RELEASE ORAL at 06:26

## 2018-05-13 RX ADMIN — PANTOPRAZOLE SODIUM 40 MILLIGRAM(S): 20 TABLET, DELAYED RELEASE ORAL at 17:03

## 2018-05-13 RX ADMIN — LOSARTAN POTASSIUM 100 MILLIGRAM(S): 100 TABLET, FILM COATED ORAL at 21:13

## 2018-05-13 RX ADMIN — Medication 200 MILLIGRAM(S): at 21:13

## 2018-05-14 ENCOUNTER — RESULT REVIEW (OUTPATIENT)
Age: 63
End: 2018-05-14

## 2018-05-14 ENCOUNTER — TRANSCRIPTION ENCOUNTER (OUTPATIENT)
Age: 63
End: 2018-05-14

## 2018-05-14 VITALS
RESPIRATION RATE: 18 BRPM | SYSTOLIC BLOOD PRESSURE: 129 MMHG | HEART RATE: 67 BPM | TEMPERATURE: 98 F | DIASTOLIC BLOOD PRESSURE: 81 MMHG | OXYGEN SATURATION: 97 %

## 2018-05-14 PROCEDURE — 76705 ECHO EXAM OF ABDOMEN: CPT

## 2018-05-14 PROCEDURE — 43239 EGD BIOPSY SINGLE/MULTIPLE: CPT

## 2018-05-14 PROCEDURE — 88305 TISSUE EXAM BY PATHOLOGIST: CPT

## 2018-05-14 PROCEDURE — 96375 TX/PRO/DX INJ NEW DRUG ADDON: CPT

## 2018-05-14 PROCEDURE — 88342 IMHCHEM/IMCYTCHM 1ST ANTB: CPT

## 2018-05-14 PROCEDURE — 99285 EMERGENCY DEPT VISIT HI MDM: CPT | Mod: 25

## 2018-05-14 PROCEDURE — 74022 RADEX COMPL AQT ABD SERIES: CPT

## 2018-05-14 PROCEDURE — 83690 ASSAY OF LIPASE: CPT

## 2018-05-14 PROCEDURE — 88342 IMHCHEM/IMCYTCHM 1ST ANTB: CPT | Mod: 26

## 2018-05-14 PROCEDURE — 87086 URINE CULTURE/COLONY COUNT: CPT

## 2018-05-14 PROCEDURE — 99239 HOSP IP/OBS DSCHRG MGMT >30: CPT

## 2018-05-14 PROCEDURE — 96376 TX/PRO/DX INJ SAME DRUG ADON: CPT

## 2018-05-14 PROCEDURE — 96374 THER/PROPH/DIAG INJ IV PUSH: CPT | Mod: XU

## 2018-05-14 PROCEDURE — 81003 URINALYSIS AUTO W/O SCOPE: CPT

## 2018-05-14 PROCEDURE — 74177 CT ABD & PELVIS W/CONTRAST: CPT

## 2018-05-14 PROCEDURE — 88305 TISSUE EXAM BY PATHOLOGIST: CPT | Mod: 26

## 2018-05-14 PROCEDURE — 93005 ELECTROCARDIOGRAM TRACING: CPT

## 2018-05-14 PROCEDURE — 36415 COLL VENOUS BLD VENIPUNCTURE: CPT

## 2018-05-14 PROCEDURE — 80053 COMPREHEN METABOLIC PANEL: CPT

## 2018-05-14 PROCEDURE — 85027 COMPLETE CBC AUTOMATED: CPT

## 2018-05-14 PROCEDURE — 80048 BASIC METABOLIC PNL TOTAL CA: CPT

## 2018-05-14 RX ORDER — PANTOPRAZOLE SODIUM 20 MG/1
40 TABLET, DELAYED RELEASE ORAL
Qty: 0 | Refills: 0 | Status: DISCONTINUED | OUTPATIENT
Start: 2018-05-14 | End: 2018-05-14

## 2018-05-14 RX ORDER — ALBUTEROL 90 UG/1
1 AEROSOL, METERED ORAL
Qty: 0 | Refills: 0 | COMMUNITY

## 2018-05-14 RX ORDER — ALBUTEROL 90 UG/1
0 AEROSOL, METERED ORAL
Qty: 0 | Refills: 0 | COMMUNITY

## 2018-05-14 RX ORDER — PANTOPRAZOLE SODIUM 20 MG/1
1 TABLET, DELAYED RELEASE ORAL
Qty: 30 | Refills: 0 | OUTPATIENT
Start: 2018-05-14

## 2018-05-14 RX ADMIN — PANTOPRAZOLE SODIUM 40 MILLIGRAM(S): 20 TABLET, DELAYED RELEASE ORAL at 05:22

## 2018-05-14 NOTE — PROGRESS NOTE ADULT - PROBLEM SELECTOR PROBLEM 4
Calculus of bile duct without cholecystitis with obstruction

## 2018-05-14 NOTE — DISCHARGE NOTE ADULT - PLAN OF CARE
stable for discharge It is important to see your primary physician as well as the physicians noted below within the next week to perform a comprehensive medical review.  Call their offices for an appointment as soon as you leave the hospital.  If you do not have a primary physician, contact the [facility] [place] [phone] located on [address] [place] [address].  Your medical issues appear to be stable at this time, but if your symptoms recur or worsen, contact your physicians and/or return to the hospital if necessary.  If you encounter any issues or questions with your medication, call your physicians before stopping the medication.  Do not drive.  Limit your diet to 2 grams of sodium daily.

## 2018-05-14 NOTE — DISCHARGE NOTE ADULT - SECONDARY DIAGNOSIS.
Calculus of bile duct without cholecystitis with obstruction Essential hypertension HTN (hypertension) Obstructive sleep apnea syndrome

## 2018-05-14 NOTE — BRIEF OPERATIVE NOTE - PROCEDURE
<<-----Click on this checkbox to enter Procedure EGD with biopsy  05/14/2018    Active  [facility]

## 2018-05-14 NOTE — PROGRESS NOTE ADULT - SUBJECTIVE AND OBJECTIVE BOX
Patient: VELMA RICHMOND [id number] 63y Male                           Internal Medicine Hospitalist Progress Note  Chief Complaint: Patient is a 63y old  Male who presents with a chief complaint of abd pains (11 May 2018 19:13)    HPI:  63 yr male with history of hypertension.  Presented sudden onset abdominal pains 1am associated with nausea vomiting. Patient took mylanta to no avail. In the morning 6am pain unremitting, localized in the epigastrium, patient came over to ED.  CT abd showing diffuse fatty infiltrates of the liver, diverticulosis without diverticulitis.  Abd ultrasound cholelithiasis without cholecystitis.  Pain has resolved with PPI therapy.  Had episodic diarrhea with resumption of po intake, now resolved.    Today, denies complaints.  No abdominal pain.  1 BM over past 24h.  no n/v.  No additional complaints. awaiting EGD.  Wife and neighbor at bedside.    ____________________PHYSICAL EXAM:  Vitals reviewed as indicated below  GENERAL:  NAD Alert and Oriented x 3   HEENT: NCAT  CARDIOVASCULAR:  S1, S2  LUNGS: CTAB  ABDOMEN:  soft, (-) tenderness, (-) distension, (+) bowel sounds, (-) guarding, (-) rebound (-) rigidity  EXTREMITIES:  no cyanosis / clubbing / edema.   ____________________    VITALS:  Vital Signs Last 24 Hrs  T(C): 36.6 (14 May 2018 08:10), Max: 36.8 (14 May 2018 00:49)  T(F): 97.8 (14 May 2018 08:10), Max: 98.2 (14 May 2018 00:49)  HR: 67 (14 May 2018 08:10) (56 - 67)  BP: 129/81 (14 May 2018 08:10) (102/54 - 132/62)  BP(mean): --  RR: 18 (14 May 2018 08:10) (16 - 18)  SpO2: 97% (14 May 2018 08:10) (97% - 97%) Daily     Daily   CAPILLARY BLOOD GLUCOSE        I&O's Summary    13 May 2018 07:01  -  14 May 2018 07:00  --------------------------------------------------------  IN: 1000 mL / OUT: 0 mL / NET: 1000 mL        LABS:                        14.9   11.2  )-----------( 239      ( 13 May 2018 13:31 )             44.5     05-13    138  |  101  |  16.0  ----------------------------<  97  4.1   |  24.0  |  0.77    Ca    8.6      13 May 2018 13:31    TPro  7.0  /  Alb  4.0  /  TBili  0.5  /  DBili  x   /  AST  26  /  ALT  28  /  AlkPhos  48  05-13      LIVER FUNCTIONS - ( 13 May 2018 13:31 )  Alb: 4.0 g/dL / Pro: 7.0 g/dL / ALK PHOS: 48 U/L / ALT: 28 U/L / AST: 26 U/L / GGT: x                   MEDICATIONS:  amLODIPine   Tablet 5 milliGRAM(s) Oral daily  enoxaparin Injectable 40 milliGRAM(s) SubCutaneous every 24 hours  HYDROmorphone  Injectable 0.5 milliGRAM(s) IV Push every 4 hours PRN  losartan 100 milliGRAM(s) Oral daily  montelukast 10 milliGRAM(s) Oral daily  pantoprazole  Injectable 40 milliGRAM(s) IV Push two times a day  theophylline ER Capsule 200 milliGRAM(s) Oral daily
INTERVAL HPI/OVERNIGHT EVENTS:Follow for abdominal pain. The patient is doing much better at this time. He is denying any abdominal pain. He is on PPI twice daily. He is tolerating a diet.      MEDICATIONS  (STANDING):  amLODIPine   Tablet 5 milliGRAM(s) Oral daily  enoxaparin Injectable 40 milliGRAM(s) SubCutaneous every 24 hours  losartan 100 milliGRAM(s) Oral daily  montelukast 10 milliGRAM(s) Oral daily  pantoprazole  Injectable 40 milliGRAM(s) IV Push two times a day  sodium chloride 0.45%. 1000 milliLiter(s) (125 mL/Hr) IV Continuous <Continuous>  theophylline ER Capsule 200 milliGRAM(s) Oral daily    MEDICATIONS  (PRN):  HYDROmorphone  Injectable 0.5 milliGRAM(s) IV Push every 4 hours PRN Severe Pain (7 - 10)      Allergies    No Known Allergies    Intolerances        Vital Signs Last 24 Hrs  T(C): 36.6 (13 May 2018 07:45), Max: 37.2 (12 May 2018 17:03)  T(F): 97.9 (13 May 2018 07:45), Max: 98.9 (12 May 2018 17:03)  HR: 66 (13 May 2018 07:45) (58 - 66)  BP: 132/88 (13 May 2018 07:45) (132/88 - 140/68)  BP(mean): --  RR: 18 (13 May 2018 07:45) (16 - 18)  SpO2: 96% (13 May 2018 07:45) (94% - 96%)    LABS:                        15.0   15.5  )-----------( 225      ( 12 May 2018 08:28 )             43.0     05-12    139  |  101  |  12.0  ----------------------------<  134<H>  4.3   |  23.0  |  0.74    Ca    8.9      12 May 2018 08:28            RADIOLOGY & ADDITIONAL TESTS:< from: CT Abdomen and Pelvis w/ Oral Cont and w/ IV Cont (05.11.18 @ 15:19) >    FINDINGS:  LUNG BASES:  Unremarkable.   LIVER:  Diffuse fatty infiltration. The gallbladder is unremarkable..  SPLEEN:   Unremarkable.  PANCREAS:  The pancreatic contour is unremarkable without evidence of   mass, inflammation or ductal dilatation..  ADRENAL GLANDS:   Unremarkable.  KIDNEYS:   3 mm right lower pole stone. No evidence of right   hydronephrosis. The left kidney is unremarkable..  AORTA:   Unremarkable.  RETROPERITONEUM:  Under marked.  PELVIC SIDE WALL:   Unremarkable.  GASTROINTESTINAL STRUCTURES:  Colonic diverticulosis. The appendix is   visualized and is normal..  BLADDER:   Unremarkable.  PELVIC ORGANS:   Unremarkable.  BONES:  Degenerative changes.  MISCELLANEOUS: Small umbilical hernia.    IMPRESSION:     Diffuse fatty infiltration of the liver.    3 mm right lower pole renal stone without evidence of hydronephrosis.    Colonic diverticulosis without evidence of diverticulitis.    Small umbilical hernia..     < end of copied text >
INTERVAL HPI/OVERNIGHT EVENTS:Patient follow up for abdominal pain. No complaints today. On PPI bid.     MEDICATIONS  (STANDING):  amLODIPine   Tablet 5 milliGRAM(s) Oral daily  enoxaparin Injectable 40 milliGRAM(s) SubCutaneous every 24 hours  losartan 100 milliGRAM(s) Oral daily  montelukast 10 milliGRAM(s) Oral daily  pantoprazole  Injectable 40 milliGRAM(s) IV Push two times a day  sodium chloride 0.45%. 1000 milliLiter(s) (125 mL/Hr) IV Continuous <Continuous>  theophylline ER Capsule 200 milliGRAM(s) Oral daily    MEDICATIONS  (PRN):  HYDROmorphone  Injectable 0.5 milliGRAM(s) IV Push every 4 hours PRN Severe Pain (7 - 10)      Allergies    No Known Allergies    Intolerances        Vital Signs Last 24 Hrs  T(C): 36.8 (12 May 2018 07:30), Max: 37.2 (11 May 2018 21:00)  T(F): 98.2 (12 May 2018 07:30), Max: 98.9 (11 May 2018 21:00)  HR: 70 (12 May 2018 07:30) (70 - 95)  BP: 137/77 (12 May 2018 07:30) (136/76 - 160/92)  BP(mean): --  RR: 18 (12 May 2018 07:30) (18 - 18)  SpO2: 98% (12 May 2018 07:30) (96% - 99%)    LABS:                        15.0   15.5  )-----------( 225      ( 12 May 2018 08:28 )             43.0     05-12    139  |  101  |  12.0  ----------------------------<  134<H>  4.3   |  23.0  |  0.74    Ca    8.9      12 May 2018 08:28    TPro  7.5  /  Alb  4.3  /  TBili  0.5  /  DBili  x   /  AST  24  /  ALT  28  /  AlkPhos  51  05-11      Urinalysis Basic - ( 11 May 2018 10:51 )    Color: Yellow / Appearance: Clear / S.010 / pH: x  Gluc: x / Ketone: Negative  / Bili: Negative / Urobili: Negative mg/dL   Blood: x / Protein: Negative mg/dL / Nitrite: Negative   Leuk Esterase: Negative / RBC: x / WBC x   Sq Epi: x / Non Sq Epi: x / Bacteria: x        RADIOLOGY & ADDITIONAL TESTS:  < from: CT Abdomen and Pelvis w/ Oral Cont and w/ IV Cont (18 @ 15:19) >    DATE AND TIME: 2018 3:13 PM.    TECHNIQUE:  Sections were obtained from the diaphragm to the pubic   symphysis following oral and intravenous contrast.     95 mls of   omnipaque 300 was administered intravenously without complication.    COMPARISON EXAMINATION:  No prior exam.      FINDINGS:  LUNG BASES:  Unremarkable.   LIVER:  Diffuse fatty infiltration. The gallbladder is unremarkable..  SPLEEN:   Unremarkable.  PANCREAS:  The pancreatic contour is unremarkable without evidence of   mass, inflammation or ductal dilatation..  ADRENAL GLANDS:   Unremarkable.  KIDNEYS:   3 mm right lower pole stone. No evidence of right   hydronephrosis. The left kidney is unremarkable..  AORTA:   Unremarkable.  RETROPERITONEUM:  Under marked.  PELVIC SIDE WALL:   Unremarkable.  GASTROINTESTINAL STRUCTURES:  Colonic diverticulosis. The appendix is   visualized and is normal..  BLADDER:   Unremarkable.  PELVIC ORGANS:   Unremarkable.  BONES:  Degenerative changes.  MISCELLANEOUS: Small umbilical hernia.    IMPRESSION:     Diffuse fatty infiltration of the liver.    3 mm right lower pole renal stone without evidence of hydronephrosis.    Colonic diverticulosis without evidence of diverticulitis.    Small umbilical hernia..     < end of copied text >
Patient: VELMA RICHMOND [id number] 63y Male                           Internal Medicine Hospitalist Progress Note  Chief Complaint: Patient is a 63y old  Male who presents with a chief complaint of abd pains (11 May 2018 19:13)    HPI:  63 yr male with history of hypertension.  Presented sudden onset abdominal pains 1am associated with nausea vomiting. Patient took mylanta to no avail. In the morning 6am pain unremitting, localized in the epigastrium, patient came over to ED.  CT abd showing diffuse fatty infiltrates of the liver, diverticulosis without diverticulitis.  Abd ultrasound cholelithiasis without cholecystitis.  Leukocytosis.   Interim history: Pain resolved with PPI.      Tolerating po intake.  Had diarrhea yesterday, improving.  Still c/o soft loose BM this am.  No Abdominal pain, nausea, vomiting.  No additional complaints.    ____________________PHYSICAL EXAM:  Vitals reviewed as indicated below  GENERAL:  NAD Alert and Oriented x 3   HEENT: NCAT  CARDIOVASCULAR:  S1, S2  LUNGS: CTAB  ABDOMEN:  soft, (-) tenderness, (-) distension, (+) bowel sounds, (-) guarding, (-) rebound (-) rigidity  EXTREMITIES:  no cyanosis / clubbing / edema.   ____________________    VITALS:  Vital Signs Last 24 Hrs  T(C): 36.6 (13 May 2018 07:45), Max: 37.2 (12 May 2018 17:03)  T(F): 97.9 (13 May 2018 07:45), Max: 98.9 (12 May 2018 17:03)  HR: 66 (13 May 2018 07:45) (58 - 66)  BP: 132/88 (13 May 2018 07:45) (132/88 - 140/68)  BP(mean): --  RR: 18 (13 May 2018 07:45) (16 - 18)  SpO2: 96% (13 May 2018 07:45) (94% - 96%) Daily     Daily   CAPILLARY BLOOD GLUCOSE        I&O's Summary    12 May 2018 07:01  -  13 May 2018 07:00  --------------------------------------------------------  IN: 3000 mL / OUT: 1 mL / NET: 2999 mL    13 May 2018 07:01  -  13 May 2018 12:07  --------------------------------------------------------  IN: 250 mL / OUT: 0 mL / NET: 250 mL        LABS:                        15.0   15.5  )-----------( 225      ( 12 May 2018 08:28 )             43.0     05-12    139  |  101  |  12.0  ----------------------------<  134<H>  4.3   |  23.0  |  0.74    Ca    8.9      12 May 2018 08:28                  MEDICATIONS:  amLODIPine   Tablet 5 milliGRAM(s) Oral daily  enoxaparin Injectable 40 milliGRAM(s) SubCutaneous every 24 hours  HYDROmorphone  Injectable 0.5 milliGRAM(s) IV Push every 4 hours PRN  losartan 100 milliGRAM(s) Oral daily  montelukast 10 milliGRAM(s) Oral daily  pantoprazole  Injectable 40 milliGRAM(s) IV Push two times a day  sodium chloride 0.45%. 1000 milliLiter(s) IV Continuous <Continuous>  theophylline ER Capsule 200 milliGRAM(s) Oral daily
Patient: VELMA RICHMOND [id number] 63y Male                           Internal Medicine Hospitalist Progress Note  Chief Complaint: Patient is a 63y old  Male who presents with a chief complaint of abd pains (11 May 2018 19:13)    HPI:  63 yr male with history of hypertension.  Presented sudden onset abdominal pains 1am associated with nausea vomiting. Patient took mylanta to no avail. In the morning 6am pain unremitting, localized in the epigastrium, patient came over to ED.  CT abd showing diffuse fatty infiltrates of the liver, diverticulosis without diverticulitis.  Abd ultrasound cholelithiasis without cholecystitis.  Leukocytosis.   Interim history: Pain resolved with PPI.  Tolerated po intake this am.  Now c/o diarrhea x 4 episodes since this am.  No fever / chills.  No abdominal pain.  Tolerating po.  Wife at bedside.     ____________________PHYSICAL EXAM:  Vitals reviewed as indicated below  GENERAL:  NAD Alert and Oriented x 3   HEENT: NCAT  CARDIOVASCULAR:  S1, S2  LUNGS: CTAB  ABDOMEN:  soft, (-) tenderness, (-) distension, (+) bowel sounds, (-) guarding, (-) rebound (-) rigidity  EXTREMITIES:  no cyanosis / clubbing / edema.   ____________________      BACKGROUND:  HEALTH ISSUES - PROBLEM Dx:  Calculus of bile duct without cholecystitis with obstruction: Calculus of bile duct without cholecystitis with obstruction  Obstructive sleep apnea syndrome: Obstructive sleep apnea syndrome  Essential hypertension: Essential hypertension  Epigastric pain: Epigastric pain        Allergies    No Known Allergies    Intolerances      PAST MEDICAL & SURGICAL HISTORY:  Sleep apnea  Aortic embolism or thrombosis  HTN (hypertension)  H/O cervical spine surgery        VITALS:  Vital Signs Last 24 Hrs  T(C): 36.8 (12 May 2018 07:30), Max: 37.2 (11 May 2018 21:00)  T(F): 98.2 (12 May 2018 07:30), Max: 98.9 (11 May 2018 21:00)  HR: 70 (12 May 2018 07:30) (70 - 95)  BP: 137/77 (12 May 2018 07:30) (137/77 - 160/92)  BP(mean): --  RR: 18 (12 May 2018 07:30) (18 - 18)  SpO2: 98% (12 May 2018 07:30) (96% - 98%) Daily Height in cm: 187.96 (11 May 2018 23:58)    Daily Weight in k.9 (12 May 2018 06:14)  CAPILLARY BLOOD GLUCOSE        I&O's Summary    11 May 2018 07:01  -  12 May 2018 07:00  --------------------------------------------------------  IN: 1125 mL / OUT: 0 mL / NET: 1125 mL    12 May 2018 07:01  -  12 May 2018 15:55  --------------------------------------------------------  IN: 1000 mL / OUT: 1 mL / NET: 999 mL          LABS:                        15.0   15.5  )-----------( 225      ( 12 May 2018 08:28 )             43.0     05-12    139  |  101  |  12.0  ----------------------------<  134<H>  4.3   |  23.0  |  0.74    Ca    8.9      12 May 2018 08:28    TPro  7.5  /  Alb  4.3  /  TBili  0.5  /  DBili  x   /  AST  24  /  ALT  28  /  AlkPhos  51  05-11      LIVER FUNCTIONS - ( 11 May 2018 08:57 )  Alb: 4.3 g/dL / Pro: 7.5 g/dL / ALK PHOS: 51 U/L / ALT: 28 U/L / AST: 24 U/L / GGT: x           Urinalysis Basic - ( 11 May 2018 10:51 )    Color: Yellow / Appearance: Clear / S.010 / pH: x  Gluc: x / Ketone: Negative  / Bili: Negative / Urobili: Negative mg/dL   Blood: x / Protein: Negative mg/dL / Nitrite: Negative   Leuk Esterase: Negative / RBC: x / WBC x   Sq Epi: x / Non Sq Epi: x / Bacteria: x              MEDICATIONS:  MEDICATIONS  (STANDING):  amLODIPine   Tablet 5 milliGRAM(s) Oral daily  enoxaparin Injectable 40 milliGRAM(s) SubCutaneous every 24 hours  losartan 100 milliGRAM(s) Oral daily  montelukast 10 milliGRAM(s) Oral daily  pantoprazole  Injectable 40 milliGRAM(s) IV Push two times a day  sodium chloride 0.45%. 1000 milliLiter(s) (125 mL/Hr) IV Continuous <Continuous>  theophylline ER Capsule 200 milliGRAM(s) Oral daily    MEDICATIONS  (PRN):  HYDROmorphone  Injectable 0.5 milliGRAM(s) IV Push every 4 hours PRN Severe Pain (7 - 10)

## 2018-05-14 NOTE — DISCHARGE NOTE ADULT - MEDICATION SUMMARY - MEDICATIONS TO TAKE
I will START or STAY ON the medications listed below when I get home from the hospital:    losartan 100 mg oral tablet  -- 1 tab(s) by mouth once a day  -- Indication: For Hypertension    Ventolin  -- 1 puff(s) inhaled every 6 hours, As Needed SOB  -- Indication: For Asthma    Sami-24 200 mg/24 hours oral capsule, extended release  -- 1 cap(s) by mouth once a day  -- Indication: For Asthma    amLODIPine 5 mg oral tablet  -- 1 tab(s) by mouth once a day  -- Indication: For Hypertension    montelukast 10 mg oral tablet  -- 1 tab(s) by mouth once a day  -- Indication: For Asthma    pantoprazole 40 mg oral delayed release tablet  -- 1 tab(s) by mouth once a day (before a meal)  -- Indication: For Esophagitis

## 2018-05-14 NOTE — BRIEF OPERATIVE NOTE - OPERATION/FINDINGS
[place] Grade B distal esophagitis seen and biopsied from 36 to 40 cm. G-E jncn. @ 40 cm. Biopsies taken to r/o Rodriguez's. Gastric biopsies taken in the body and antrum to r/o H. Pylori infection. Duodenal biopsies taken to r/o celiac disease.

## 2018-05-14 NOTE — DISCHARGE NOTE ADULT - CARE PLAN
Principal Discharge DX:	Esophagitis  Goal:	stable for discharge  Assessment and plan of treatment:	It is important to see your primary physician as well as the physicians noted below within the next week to perform a comprehensive medical review.  Call their offices for an appointment as soon as you leave the hospital.  If you do not have a primary physician, contact the [facility] [phone] located on [address] [place] [address], [place] [address].  Your medical issues appear to be stable at this time, but if your symptoms recur or worsen, contact your physicians and/or return to the hospital if necessary.  If you encounter any issues or questions with your medication, call your physicians before stopping the medication.  Do not drive.  Limit your diet to 2 grams of sodium daily.  Secondary Diagnosis:	Calculus of bile duct without cholecystitis with obstruction  Secondary Diagnosis:	Essential hypertension  Secondary Diagnosis:	HTN (hypertension)  Secondary Diagnosis:	Obstructive sleep apnea syndrome

## 2018-05-14 NOTE — PROGRESS NOTE ADULT - PROBLEM SELECTOR PLAN 1
Protonix 40mg iv bid   GI following and contemplating EGD
Likely Gastroduodenitis.  Expect Protonix BID po.  Medically stable for EGD.
Protonix 40mg iv bid   EGD in am per GI.  Pt medically stable for EGD.

## 2018-05-14 NOTE — DISCHARGE NOTE ADULT - HOSPITAL COURSE
Patient: VELMA RICHMOND [id number]                 Internal Medicine Hospitalist Discharge Note  Chief Complaint: Patient is a 63y old  Male who presents with a chief complaint of abd pains (11 May 2018 19:13)    HPI:  63 yr male with history of hypertension.  Presented sudden onset abdominal pains 1am associated with nausea vomiting. Patient took mylanta to no avail. In the morning 6am pain unremitting, localized in the epigastrium, patient came over to ED.  CT abd showing diffuse fatty infiltrates of the liver, diverticulosis without diverticulitis.  Abd ultrasound cholelithiasis without cholecystitis.  Pain has resolved with PPI therapy.  Had episodic diarrhea with resumption of po intake, now resolved.  EGD showed distal Grade B esophagitis.      Today, denies complaints.  No abdominal pain.  1 BM over past 24h.  no n/v.  No additional complaints. awaiting EGD.  Wife and neighbor at bedside.    63 yr male with abdominal pain, cholelithiasis.     > Diarrhea / Gastroenteritis - Supportive care.  symptoms resolved.     Problem/Plan - 1:  ·  Problem: Epigastric pain.  Plan: Distal Esophagitis - continue Protonix daily, outpatient GI followup d/w Dr. Walton.      Problem/Plan - 2:  ·  Problem: Essential hypertension.  Plan: amlodipine 5mg po daily   Losartan 100mg po daily.     Problem/Plan - 3:  ·  Problem: Obstructive sleep apnea syndrome.  Plan: Stable. Outpatient follow up with Pulmonology.     Problem/Plan - 4:  ·  Problem: Calculus of bile duct without cholecystitis with obstruction.  Plan: Asymptomatic.  Conservative mgt per GI.       Disposition: stable for discharge.  Outpatient followup as above.  Patient was advised to return if they experience any recurrence or worsening of symptoms.  Total time spent on discharge was 35 minutes.  -Roberto Cat D.O., Hospitalist, [facility]

## 2018-05-14 NOTE — DISCHARGE NOTE ADULT - PATIENT PORTAL LINK FT
You can access the [facility] Patient Portal, offered by [facility], by registering with the following website: [email or web address]

## 2018-05-14 NOTE — DISCHARGE NOTE ADULT - CARE PROVIDER_API CALL
Hayes Walton), Gastroenterology; Internal Medicine  [address] [place] [address]  Phone: [phone]  Fax: [phone]

## 2018-05-14 NOTE — PROGRESS NOTE ADULT - PROBLEM SELECTOR PLAN 2
amlodipine 5mg po daily   Losartan 100mg po daily

## 2018-05-14 NOTE — BRIEF OPERATIVE NOTE - COMMENTS
Pt. may be discharged home from GI standpoint on Pantoprazole 40 mg./d. to Rx the above esophagitis. OPT f/u in our office with Dr. Joseph in 4 to 6 weeks.

## 2018-05-14 NOTE — PROGRESS NOTE ADULT - ASSESSMENT
63 yr male with abdominal pain, cholelithiasis.     > ? Diarrhea / Gastroenteritis - Supportive care.  symptoms resolved.
63 yr male with abdominal pain, cholelithiasis.     > ? Diarrhea / Gastroenteritis - Supportive care.  IVF.  Continue diet as tolerated.
63 yr male with abdominal pain, cholelithiasis.     > ? Diarrhea / Gastroenteritis - Supportive care.  IVF.  Advance diet as tolerated.  If diarrhea persists, will pursue stool studies.
Patient with abdominal pain, nausea, vomiting. Second episode. No clear etiology noted. Cholelithiasis and renal stone noted. LFTs and lipase normal.     1. Continue PPI bid  2. Advance to full liquid diet  3. If continues to have abdominal discomfort, needs EGD on monday.
The abdominal pain has abated. He is doing very well at this time. No clear etiology has been ordered so far. Most likely he has gastroduodenitis. We will schedule him for an upper endoscopy for tomorrow.  NPO after midnight orders have been placed.

## 2018-05-14 NOTE — PROGRESS NOTE ADULT - PROBLEM SELECTOR PLAN 4
Asymptomatic.  Conservative mgt per GI.
Pain control   GI consulted and following .   Considering endoscopy
Asymptomatic.  Conservative mgt per GI.

## 2018-05-14 NOTE — PROGRESS NOTE ADULT - PROBLEM SELECTOR PLAN 3
Stable. Outpatient follow up with Pulmonology

## 2018-05-17 LAB — SURGICAL PATHOLOGY FINAL REPORT - CH: SIGNIFICANT CHANGE UP

## 2018-05-22 ENCOUNTER — APPOINTMENT (OUTPATIENT)
Dept: CARDIOLOGY | Facility: CLINIC | Age: 63
End: 2018-05-22
Payer: COMMERCIAL

## 2018-05-22 PROCEDURE — 78452 HT MUSCLE IMAGE SPECT MULT: CPT

## 2018-05-22 PROCEDURE — A9500: CPT

## 2018-05-22 PROCEDURE — 93015 CV STRESS TEST SUPVJ I&R: CPT

## 2018-05-25 RX ADMIN — SODIUM CHLORIDE 500 MILLILITER(S): 9 INJECTION INTRAMUSCULAR; INTRAVENOUS; SUBCUTANEOUS at 07:49

## 2018-06-06 ENCOUNTER — APPOINTMENT (OUTPATIENT)
Dept: GASTROENTEROLOGY | Facility: CLINIC | Age: 63
End: 2018-06-06
Payer: COMMERCIAL

## 2018-06-06 VITALS
DIASTOLIC BLOOD PRESSURE: 84 MMHG | HEIGHT: 68 IN | HEART RATE: 64 BPM | RESPIRATION RATE: 16 BRPM | BODY MASS INDEX: 33.34 KG/M2 | WEIGHT: 220 LBS | SYSTOLIC BLOOD PRESSURE: 143 MMHG | OXYGEN SATURATION: 98 %

## 2018-06-06 DIAGNOSIS — R07.9 CHEST PAIN, UNSPECIFIED: ICD-10-CM

## 2018-06-06 PROCEDURE — 99214 OFFICE O/P EST MOD 30 MIN: CPT

## 2018-08-21 ENCOUNTER — EMERGENCY (EMERGENCY)
Facility: HOSPITAL | Age: 63
LOS: 1 days | Discharge: HOME | End: 2018-08-21
Attending: EMERGENCY MEDICINE
Payer: COMMERCIAL

## 2018-08-21 VITALS
TEMPERATURE: 99 F | HEART RATE: 71 BPM | RESPIRATION RATE: 18 BRPM | OXYGEN SATURATION: 96 % | SYSTOLIC BLOOD PRESSURE: 131 MMHG | DIASTOLIC BLOOD PRESSURE: 77 MMHG

## 2018-08-21 DIAGNOSIS — Z98.890 OTHER SPECIFIED POSTPROCEDURAL STATES: Chronic | ICD-10-CM

## 2018-08-21 PROBLEM — I74.10 EMBOLISM AND THROMBOSIS OF UNSPECIFIED PARTS OF AORTA: Chronic | Status: ACTIVE | Noted: 2018-03-02

## 2018-08-21 PROBLEM — I10 ESSENTIAL (PRIMARY) HYPERTENSION: Chronic | Status: ACTIVE | Noted: 2018-03-02

## 2018-08-21 PROBLEM — G47.30 SLEEP APNEA, UNSPECIFIED: Chronic | Status: ACTIVE | Noted: 2018-03-02

## 2018-08-21 PROCEDURE — 99283 EMERGENCY DEPT VISIT LOW MDM: CPT | Mod: 25

## 2018-08-21 PROCEDURE — 99283 EMERGENCY DEPT VISIT LOW MDM: CPT

## 2018-08-21 PROCEDURE — 96372 THER/PROPH/DIAG INJ SC/IM: CPT

## 2018-08-21 RX ORDER — PANTOPRAZOLE SODIUM 20 MG/1
40 TABLET, DELAYED RELEASE ORAL ONCE
Qty: 0 | Refills: 0 | Status: COMPLETED | OUTPATIENT
Start: 2018-08-21 | End: 2018-08-21

## 2018-08-21 RX ADMIN — Medication 10 MILLIGRAM(S): at 14:58

## 2018-08-21 RX ADMIN — PANTOPRAZOLE SODIUM 40 MILLIGRAM(S): 20 TABLET, DELAYED RELEASE ORAL at 14:44

## 2018-08-21 NOTE — ED ADULT NURSE NOTE - NSIMPLEMENTINTERV_GEN_ALL_ED
Implemented All Universal Safety Interventions:  [place] to call system. Call bell, personal items and telephone within reach. Instruct patient to call for assistance. Room bathroom lighting operational. Non-slip footwear when patient is off stretcher. Physically safe environment: no spills, clutter or unnecessary equipment. Stretcher in lowest position, wheels locked, appropriate side rails in place.

## 2018-08-21 NOTE — ED ADULT TRIAGE NOTE - CHIEF COMPLAINT QUOTE
Patient states that he was sent to the ED by Dr. Joseph. Patient c/o left sided abdominal pain x 3 days. Patient c/o N/V

## 2018-08-21 NOTE — ED STATDOCS - OBJECTIVE STATEMENT
Patient is a 63 year old M with PMHx of HTN and esophagitis who presents to the ED complaining of 7/10 stabbing LUQ abdominal pain and vomiting for the past 3 days.  Notes that the pain is only after eating and last episode of emesis was last night.  Patient was sent to the ED by  at Dr. Guan office because he is on vacation.  Patient was admitted in May for grade b esophagitis after having similar symptoms.  Notes that he was in a hospital in [place] on Saturday night for abdominal pain and vomiting and was discharged Sunday morning.  Denies any fever but states that he is feeling "hot."  States that these same symptoms seem to appear every 3 months, even though he is taking his Protonix 40mg along with his other medications.  Notes that he normally takes Protonix around 3-4PM and took no pain medication today.  Denies any urinary symptoms, or other medical complaints at this time. This patient is a 63 year old allyson with PMHx of HTN and esophagitis who presents to the ED complaining of 7/10 stabbing LUQ abdominal pain and vomiting for the past 3 days.  Notes that the pain is only after eating and last episode of emesis was last night.  Patient was sent to the ED by  at Dr. Guan office because he is on vacation.  Patient was admitted in May for grade b esophagitis after having similar symptoms.  Notes that he was in a hospital in [place] on Saturday night for abdominal pain and vomiting and was discharged Sunday morning.  Denies any fever but states that he is feeling "hot."  States that these same symptoms seem to appear every 3 months, even though he is taking his Protonix 40mg along with his other medications.  Notes that he normally takes Protonix around 3-4PM and took no pain medication today.  Denies any urinary symptoms, or other medical complaints at this time.

## 2018-08-21 NOTE — ED STATDOCS - NS_ ATTENDINGSCRIBEDETAILS _ED_A_ED_FT
I, Trudy Bhatia, performed the initial face to face bedside interview with this patient regarding history of present illness, review of symptoms and relevant past medical, social and family history.  I completed an independent physical examination.  I was the provider who initially evaluated this patient.  The history, relevant review of systems, past medical and surgical history, medical decision making, and physical examination was documented by the scribe in my presence and I attest to the accuracy of the documentation. Follow-up on ordered tests (ie labs, radiologic studies) and re-evaluation of the patient's status has been communicated to the ACP.  Disposition of the patient will be based on test outcome and response to ED interventions.

## 2018-08-21 NOTE — ED ADULT NURSE REASSESSMENT NOTE - NS ED NURSE REASSESS COMMENT FT1
pt concerned not enough is being done about his care, informed donya hines, she had a conversation with pt

## 2018-08-21 NOTE — ED STATDOCS - PROGRESS NOTE DETAILS
PT evaluated by intake physician. HPI/PE/ROS as noted above. Will follow up plan per intake physician. Pt reevaluated: PT po tolerated and denies discomfort at this time. rx bentyl. PT has f/u with GI. PT verbalized understanding of diagnosis and importance of follow up at PMD. PT educated on importance of follow up and when to return to the ED.

## 2018-08-21 NOTE — ED STATDOCS - MEDICAL DECISION MAKING DETAILS
Will give daily Protonix, Bentyl, and PO challenge.  If tolerates PO, patient can be discharged.  Can prescribe him to go home with Bentyl if he is fine in ER Patient with esophagitis no vomiting today c/o abdominal pain.  Will give daily Protonix, Bentyl, and PO challenge.  If tolerates PO, patient can be discharged.  Can prescribe him to go home with Bentyl if he is fine in ER

## 2018-08-21 NOTE — ED ADULT NURSE NOTE - OBJECTIVE STATEMENT
pt arrived with left side abd pain x 3 days, pt sent to ed by dr angelo, pt states pain after eating

## 2018-08-27 ENCOUNTER — APPOINTMENT (OUTPATIENT)
Dept: GASTROENTEROLOGY | Facility: CLINIC | Age: 63
End: 2018-08-27
Payer: COMMERCIAL

## 2018-08-27 VITALS
WEIGHT: 223 LBS | RESPIRATION RATE: 16 BRPM | SYSTOLIC BLOOD PRESSURE: 120 MMHG | HEART RATE: 68 BPM | BODY MASS INDEX: 33.8 KG/M2 | HEIGHT: 68 IN | DIASTOLIC BLOOD PRESSURE: 84 MMHG | OXYGEN SATURATION: 98 %

## 2018-08-27 PROCEDURE — 99214 OFFICE O/P EST MOD 30 MIN: CPT

## 2018-09-05 LAB — H PYLORI AG STL QL: NEGATIVE

## 2018-09-10 ENCOUNTER — OTHER (OUTPATIENT)
Age: 63
End: 2018-09-10

## 2018-09-19 ENCOUNTER — APPOINTMENT (OUTPATIENT)
Dept: GASTROENTEROLOGY | Facility: CLINIC | Age: 63
End: 2018-09-19

## 2018-11-05 ENCOUNTER — APPOINTMENT (OUTPATIENT)
Dept: GASTROENTEROLOGY | Facility: CLINIC | Age: 63
End: 2018-11-05
Payer: COMMERCIAL

## 2018-11-05 VITALS
DIASTOLIC BLOOD PRESSURE: 91 MMHG | RESPIRATION RATE: 15 BRPM | WEIGHT: 222 LBS | HEART RATE: 57 BPM | HEIGHT: 68 IN | SYSTOLIC BLOOD PRESSURE: 147 MMHG | BODY MASS INDEX: 33.65 KG/M2 | OXYGEN SATURATION: 98 %

## 2018-11-05 DIAGNOSIS — K58.9 IRRITABLE BOWEL SYNDROME W/OUT DIARRHEA: ICD-10-CM

## 2018-11-05 DIAGNOSIS — A04.8 OTHER SPECIFIED BACTERIAL INTESTINAL INFECTIONS: ICD-10-CM

## 2018-11-05 PROCEDURE — 99214 OFFICE O/P EST MOD 30 MIN: CPT

## 2018-11-05 RX ORDER — CLARITHROMYCIN 500 MG/1
500 TABLET, FILM COATED ORAL
Qty: 28 | Refills: 0 | Status: COMPLETED | COMMUNITY
Start: 2018-06-06 | End: 2018-11-05

## 2018-11-05 RX ORDER — AMOXICILLIN 500 MG/1
500 CAPSULE ORAL TWICE DAILY
Qty: 56 | Refills: 0 | Status: COMPLETED | COMMUNITY
Start: 2018-06-06 | End: 2018-11-05

## 2018-12-05 ENCOUNTER — APPOINTMENT (OUTPATIENT)
Dept: GASTROENTEROLOGY | Facility: GI CENTER | Age: 63
End: 2018-12-05
Payer: COMMERCIAL

## 2018-12-05 ENCOUNTER — RESULT REVIEW (OUTPATIENT)
Age: 63
End: 2018-12-05

## 2018-12-05 ENCOUNTER — OUTPATIENT (OUTPATIENT)
Dept: OUTPATIENT SERVICES | Facility: HOSPITAL | Age: 63
LOS: 1 days | End: 2018-12-05
Payer: COMMERCIAL

## 2018-12-05 VITALS
DIASTOLIC BLOOD PRESSURE: 90 MMHG | SYSTOLIC BLOOD PRESSURE: 140 MMHG | RESPIRATION RATE: 15 BRPM | WEIGHT: 222 LBS | TEMPERATURE: 97.7 F | BODY MASS INDEX: 33.65 KG/M2 | HEIGHT: 68 IN | HEART RATE: 58 BPM | OXYGEN SATURATION: 97 %

## 2018-12-05 DIAGNOSIS — K20.9 ESOPHAGITIS, UNSPECIFIED: ICD-10-CM

## 2018-12-05 DIAGNOSIS — Z98.890 OTHER SPECIFIED POSTPROCEDURAL STATES: Chronic | ICD-10-CM

## 2018-12-05 DIAGNOSIS — Z12.11 ENCOUNTER FOR SCREENING FOR MALIGNANT NEOPLASM OF COLON: ICD-10-CM

## 2018-12-05 DIAGNOSIS — K58.9 IRRITABLE BOWEL SYNDROME WITHOUT DIARRHEA: ICD-10-CM

## 2018-12-05 PROCEDURE — 88342 IMHCHEM/IMCYTCHM 1ST ANTB: CPT

## 2018-12-05 PROCEDURE — 45380 COLONOSCOPY AND BIOPSY: CPT | Mod: 33,59

## 2018-12-05 PROCEDURE — 43239 EGD BIOPSY SINGLE/MULTIPLE: CPT

## 2018-12-05 PROCEDURE — 45380 COLONOSCOPY AND BIOPSY: CPT | Mod: PT

## 2018-12-05 PROCEDURE — 88305 TISSUE EXAM BY PATHOLOGIST: CPT

## 2018-12-05 PROCEDURE — 88305 TISSUE EXAM BY PATHOLOGIST: CPT | Mod: 26

## 2018-12-05 PROCEDURE — 88342 IMHCHEM/IMCYTCHM 1ST ANTB: CPT | Mod: 26

## 2018-12-07 ENCOUNTER — APPOINTMENT (OUTPATIENT)
Dept: CARDIOLOGY | Facility: CLINIC | Age: 63
End: 2018-12-07
Payer: COMMERCIAL

## 2018-12-07 ENCOUNTER — NON-APPOINTMENT (OUTPATIENT)
Age: 63
End: 2018-12-07

## 2018-12-07 VITALS
HEIGHT: 68 IN | BODY MASS INDEX: 33.65 KG/M2 | SYSTOLIC BLOOD PRESSURE: 132 MMHG | HEART RATE: 61 BPM | WEIGHT: 222 LBS | DIASTOLIC BLOOD PRESSURE: 70 MMHG | OXYGEN SATURATION: 98 %

## 2018-12-07 DIAGNOSIS — G47.33 OBSTRUCTIVE SLEEP APNEA (ADULT) (PEDIATRIC): ICD-10-CM

## 2018-12-07 DIAGNOSIS — I71.9 AORTIC ANEURYSM OF UNSPECIFIED SITE, W/OUT RUPTURE: ICD-10-CM

## 2018-12-07 DIAGNOSIS — I65.23 OCCLUSION AND STENOSIS OF BILATERAL CAROTID ARTERIES: ICD-10-CM

## 2018-12-07 LAB — SURGICAL PATHOLOGY FINAL REPORT - CH: SIGNIFICANT CHANGE UP

## 2018-12-07 PROCEDURE — 99214 OFFICE O/P EST MOD 30 MIN: CPT

## 2018-12-07 PROCEDURE — 93000 ELECTROCARDIOGRAM COMPLETE: CPT

## 2018-12-07 RX ORDER — PANTOPRAZOLE 40 MG/1
40 TABLET, DELAYED RELEASE ORAL
Qty: 90 | Refills: 0 | Status: ACTIVE | COMMUNITY
Start: 2018-08-18

## 2018-12-07 RX ORDER — DICYCLOMINE HYDROCHLORIDE 10 MG/1
10 CAPSULE ORAL 3 TIMES DAILY
Qty: 90 | Refills: 2 | Status: DISCONTINUED | COMMUNITY
Start: 2018-11-05 | End: 2018-12-07

## 2018-12-07 RX ORDER — OMEPRAZOLE 20 MG/1
20 CAPSULE, DELAYED RELEASE ORAL TWICE DAILY
Qty: 28 | Refills: 0 | Status: DISCONTINUED | COMMUNITY
Start: 2018-06-06 | End: 2018-12-07

## 2018-12-07 NOTE — PHYSICAL EXAM
[General Appearance - Well Developed] : well developed [Normal Appearance] : normal appearance [Well Groomed] : well groomed [General Appearance - Well Nourished] : well nourished [General Appearance - In No Acute Distress] : no acute distress [Normal Conjunctiva] : the conjunctiva exhibited no abnormalities [No Oral Pallor] : no oral pallor [No Oral Cyanosis] : no oral cyanosis [Normal Jugular Venous V Waves Present] : normal jugular venous V waves present [No Jugular Venous Marie A Waves] : no jugular venous marie A waves [Respiration, Rhythm And Depth] : normal respiratory rhythm and effort [Exaggerated Use Of Accessory Muscles For Inspiration] : no accessory muscle use [Auscultation Breath Sounds / Voice Sounds] : lungs were clear to auscultation bilaterally [Heart Rate And Rhythm] : heart rate and rhythm were normal [Heart Sounds] : normal S1 and S2 [Murmurs] : no murmurs present [Arterial Pulses Normal] : the arterial pulses were normal [Veins - Varicosity Changes] : no varicosital changes were noted in the lower extremities [Abdomen Soft] : soft [Abdomen Tenderness] : non-tender [Abnormal Walk] : normal gait [Nail Clubbing] : no clubbing of the fingernails [Cyanosis, Localized] : no localized cyanosis [Skin Turgor] : normal skin turgor [] : no rash [Oriented To Time, Place, And Person] : oriented to person, place, and time [Impaired Insight] : insight and judgment were intact [Affect] : the affect was normal

## 2018-12-14 NOTE — HISTORY OF PRESENT ILLNESS
[FreeTextEntry1] : 63-year-old male with a history of hypertension and hypercholesterolemia for follow up of a dilated aortic root. A cardiac evaluation in the past included an echocardiogram that showed normal left ventricular function with no significant valvular heart disease with mild aortic root dilatation 4.0 cm. A treadmill nuclear stress test showed no evidence of ischemia at that time. He continues to feel  well and is losing weight.   He denies chest discomfort, denies shortness of breath, denies palpitations, denies syncope or pre-syncope. His echocardiogram in August 2017 demonstrated normal LV function with mild aortic root dilation of 3.78 cm which is stable.  He denies chest pain or dyspnea.  He was diagnosed with sleep apnea and has been compliant with the device which he uses religiously. He just had a GI evaluation which was unremarkable.  \par

## 2019-01-10 ENCOUNTER — OTHER (OUTPATIENT)
Age: 64
End: 2019-01-10

## 2019-02-01 ENCOUNTER — OTHER (OUTPATIENT)
Age: 64
End: 2019-02-01

## 2019-02-19 ENCOUNTER — FORM ENCOUNTER (OUTPATIENT)
Age: 64
End: 2019-02-19

## 2019-02-20 ENCOUNTER — APPOINTMENT (OUTPATIENT)
Dept: CT IMAGING | Facility: CLINIC | Age: 64
End: 2019-02-20
Payer: COMMERCIAL

## 2019-02-20 ENCOUNTER — OUTPATIENT (OUTPATIENT)
Dept: OUTPATIENT SERVICES | Facility: HOSPITAL | Age: 64
LOS: 1 days | End: 2019-02-20
Payer: COMMERCIAL

## 2019-02-20 DIAGNOSIS — I65.23 OCCLUSION AND STENOSIS OF BILATERAL CAROTID ARTERIES: ICD-10-CM

## 2019-02-20 DIAGNOSIS — Z98.890 OTHER SPECIFIED POSTPROCEDURAL STATES: Chronic | ICD-10-CM

## 2019-02-20 DIAGNOSIS — I71.9 AORTIC ANEURYSM OF UNSPECIFIED SITE, WITHOUT RUPTURE: ICD-10-CM

## 2019-02-20 PROCEDURE — 82565 ASSAY OF CREATININE: CPT

## 2019-02-20 PROCEDURE — 74174 CTA ABD&PLVS W/CONTRAST: CPT

## 2019-02-20 PROCEDURE — 74174 CTA ABD&PLVS W/CONTRAST: CPT | Mod: 26

## 2019-05-08 ENCOUNTER — MEDICATION RENEWAL (OUTPATIENT)
Age: 64
End: 2019-05-08

## 2019-05-08 RX ORDER — DICYCLOMINE HYDROCHLORIDE 10 MG/1
10 CAPSULE ORAL 3 TIMES DAILY
Qty: 90 | Refills: 2 | Status: ACTIVE | COMMUNITY
Start: 2018-08-27 | End: 1900-01-01

## 2019-12-18 ENCOUNTER — APPOINTMENT (OUTPATIENT)
Dept: CARDIOLOGY | Facility: CLINIC | Age: 64
End: 2019-12-18
Payer: COMMERCIAL

## 2019-12-18 PROCEDURE — 93306 TTE W/DOPPLER COMPLETE: CPT

## 2019-12-18 PROCEDURE — 93880 EXTRACRANIAL BILAT STUDY: CPT

## 2019-12-20 ENCOUNTER — NON-APPOINTMENT (OUTPATIENT)
Age: 64
End: 2019-12-20

## 2019-12-20 ENCOUNTER — APPOINTMENT (OUTPATIENT)
Dept: CARDIOLOGY | Facility: CLINIC | Age: 64
End: 2019-12-20
Payer: COMMERCIAL

## 2019-12-20 VITALS
WEIGHT: 221 LBS | OXYGEN SATURATION: 95 % | HEART RATE: 61 BPM | DIASTOLIC BLOOD PRESSURE: 62 MMHG | HEIGHT: 68 IN | BODY MASS INDEX: 33.49 KG/M2 | SYSTOLIC BLOOD PRESSURE: 126 MMHG

## 2019-12-20 DIAGNOSIS — R94.31 ABNORMAL ELECTROCARDIOGRAM [ECG] [EKG]: ICD-10-CM

## 2019-12-20 PROCEDURE — 99214 OFFICE O/P EST MOD 30 MIN: CPT

## 2019-12-20 PROCEDURE — 93000 ELECTROCARDIOGRAM COMPLETE: CPT

## 2019-12-20 RX ORDER — ONDANSETRON 8 MG/1
8 TABLET, ORALLY DISINTEGRATING ORAL
Qty: 30 | Refills: 0 | Status: DISCONTINUED | COMMUNITY
Start: 2018-08-19 | End: 2019-12-20

## 2019-12-20 RX ORDER — RANITIDINE 150 MG/1
150 TABLET ORAL
Qty: 30 | Refills: 2 | Status: DISCONTINUED | COMMUNITY
Start: 2018-06-06 | End: 2019-12-20

## 2020-06-01 ENCOUNTER — APPOINTMENT (OUTPATIENT)
Dept: GASTROENTEROLOGY | Facility: CLINIC | Age: 65
End: 2020-06-01
Payer: COMMERCIAL

## 2020-06-01 DIAGNOSIS — K63.5 POLYP OF COLON: ICD-10-CM

## 2020-06-01 PROCEDURE — 99442: CPT

## 2020-06-01 RX ORDER — POLYETHYLENE GLYCOL 3350, SODIUM SULFATE, SODIUM CHLORIDE, POTASSIUM CHLORIDE, ASCORBIC ACID, SODIUM ASCORBATE 7.5-2.691G
100 KIT ORAL
Qty: 1 | Refills: 0 | Status: COMPLETED | COMMUNITY
Start: 2018-11-05 | End: 2020-06-01

## 2020-06-01 NOTE — ASSESSMENT
[FreeTextEntry1] : The patient has no alarming symptoms. As he had an irregular Z line, it's also important to make sure he has no Rodriguez's esophagus. We will schedule him for an upper endoscopy. Continue PPI at this time. Risks (including bleeding, pain, perforation, incomplete examination, adverse reactions to medications, aspiration and death), benefits and alternatives were discussed. Patient is agreeable for the EGD. The patient is medically optimized for the procedure. We will schedule the patient for the procedure.\par \par \par I spent 15 minutes on the encounter\par \par \par Timothy Joseph MD\par Gastroenterology \par \par \par

## 2020-06-01 NOTE — HISTORY OF PRESENT ILLNESS
[de-identified] : Due to COVID 19 pandemic, telephonic visit was scheduled to decrease any chance of exposure. Verbal consent was obtained from the patient.\par \par The patient has been evaluated in 2018. He has history of acid reflux. He had Helicobacter pylori positive in the past. He had undergone treatment. He underwent EGD which revealed gastritis with no evidence of Helicobacter pylori on pathology and if irregular Z line with no evidence of Rodriguez's esophagus. He has been taking PPI. He has been taking dicyclomine. He is interested in having a repeat EGD. His last colonoscopy was at the time of upper endoscopy. 5 year followup was recommended for colonoscopy. He follows regularly but the cardiologist on a yearly basis. His last one was in December 2019. He had blood work done by his PCP. He was checked for COVID 19 which was negative.

## 2020-10-08 DIAGNOSIS — Z01.818 ENCOUNTER FOR OTHER PREPROCEDURAL EXAMINATION: ICD-10-CM

## 2020-10-11 ENCOUNTER — APPOINTMENT (OUTPATIENT)
Dept: DISASTER EMERGENCY | Facility: CLINIC | Age: 65
End: 2020-10-11

## 2020-10-12 LAB — SARS-COV-2 N GENE NPH QL NAA+PROBE: NOT DETECTED

## 2020-10-14 ENCOUNTER — OUTPATIENT (OUTPATIENT)
Dept: OUTPATIENT SERVICES | Facility: HOSPITAL | Age: 65
LOS: 1 days | End: 2020-10-14
Payer: COMMERCIAL

## 2020-10-14 ENCOUNTER — RESULT REVIEW (OUTPATIENT)
Age: 65
End: 2020-10-14

## 2020-10-14 ENCOUNTER — APPOINTMENT (OUTPATIENT)
Dept: GASTROENTEROLOGY | Facility: GI CENTER | Age: 65
End: 2020-10-14
Payer: COMMERCIAL

## 2020-10-14 DIAGNOSIS — Z98.890 OTHER SPECIFIED POSTPROCEDURAL STATES: Chronic | ICD-10-CM

## 2020-10-14 DIAGNOSIS — K20.90 ESOPHAGITIS, UNSPECIFIED WITHOUT BLEEDING: ICD-10-CM

## 2020-10-14 DIAGNOSIS — K22.9 DISEASE OF ESOPHAGUS, UNSPECIFIED: ICD-10-CM

## 2020-10-14 PROCEDURE — 43239 EGD BIOPSY SINGLE/MULTIPLE: CPT

## 2020-10-14 PROCEDURE — 88305 TISSUE EXAM BY PATHOLOGIST: CPT

## 2020-10-14 PROCEDURE — 88305 TISSUE EXAM BY PATHOLOGIST: CPT | Mod: 26

## 2020-10-14 NOTE — PROCEDURE
[Allergies Reviewed] : allergies reviewed. [With Biopsy] : with biopsy [Procedure Explained] : The procedure was explained [Benefits] : benefits [Risks] : Risks [Consent Obtained] : written consent was obtained prior to the procedure and is detailed in the patient's record [Alternatives] : alternatives [Cardiac Monitor] : cardiac monitor [Patient] : the patient [Automated Blood Pressure Cuff] : automated blood pressure cuff [Performed By: ___] : Performed by:  NIEVES [2] : 2 [Pulse Oximeter] : pulse oximeter [Erythema] : erythema [Normal] : Normal [Sent to Pathology] : was sent to pathology for analysis [Tolerated Well] : the patient tolerated the procedure well [Vital Signs Stable] : the vital signs were stable [de-identified] : Irregular Z line [de-identified] : Irregular Z line at 42 cm. Multiple 4 quadrant biopsies performed [de-identified] : Z line at 42 cm

## 2020-10-14 NOTE — PROCEDURE
[Procedure Explained] : The procedure was explained [With Biopsy] : with biopsy [Allergies Reviewed] : allergies reviewed. [Risks] : Risks [Benefits] : benefits [Consent Obtained] : written consent was obtained prior to the procedure and is detailed in the patient's record [Alternatives] : alternatives [Patient] : the patient [Cardiac Monitor] : cardiac monitor [Automated Blood Pressure Cuff] : automated blood pressure cuff [Performed By: ___] : Performed by:  NIEVES [2] : 2 [Pulse Oximeter] : pulse oximeter [Erythema] : erythema [Sent to Pathology] : was sent to pathology for analysis [Normal] : Normal [Tolerated Well] : the patient tolerated the procedure well [Vital Signs Stable] : the vital signs were stable [de-identified] : Irregular Z line [de-identified] : Irregular Z line at 42 cm. Multiple 4 quadrant biopsies performed [de-identified] : Z line at 42 cm

## 2020-10-14 NOTE — ASSESSMENT
[FreeTextEntry1] : IMPRESSION:\par Irregular Z line\par \par RECOMMENDATIONS:\par Await pathology results

## 2020-10-17 LAB — SURGICAL PATHOLOGY STUDY: SIGNIFICANT CHANGE UP

## 2020-12-18 ENCOUNTER — APPOINTMENT (OUTPATIENT)
Dept: CARDIOLOGY | Facility: CLINIC | Age: 65
End: 2020-12-18
Payer: MEDICARE

## 2020-12-18 VITALS
SYSTOLIC BLOOD PRESSURE: 140 MMHG | BODY MASS INDEX: 33.19 KG/M2 | WEIGHT: 219 LBS | DIASTOLIC BLOOD PRESSURE: 83 MMHG | HEIGHT: 68 IN

## 2020-12-18 VITALS — DIASTOLIC BLOOD PRESSURE: 83 MMHG | SYSTOLIC BLOOD PRESSURE: 129 MMHG

## 2020-12-18 DIAGNOSIS — I10 ESSENTIAL (PRIMARY) HYPERTENSION: ICD-10-CM

## 2020-12-18 DIAGNOSIS — I77.810 THORACIC AORTIC ECTASIA: ICD-10-CM

## 2020-12-18 PROCEDURE — 99213 OFFICE O/P EST LOW 20 MIN: CPT | Mod: 95

## 2020-12-18 RX ORDER — AMLODIPINE BESYLATE 5 MG/1
5 TABLET ORAL
Refills: 0 | Status: ACTIVE | COMMUNITY
Start: 2017-07-10

## 2020-12-22 PROBLEM — I77.810 DILATED AORTIC ROOT: Status: ACTIVE | Noted: 2019-12-20

## 2020-12-22 NOTE — PHYSICAL EXAM
[General Appearance - Well Developed] : well developed [Normal Appearance] : normal appearance [Well Groomed] : well groomed [General Appearance - Well Nourished] : well nourished [General Appearance - In No Acute Distress] : no acute distress [Normal Conjunctiva] : the conjunctiva exhibited no abnormalities [] : no respiratory distress [Skin Turgor] : normal skin turgor [Oriented To Time, Place, And Person] : oriented to person, place, and time [Impaired Insight] : insight and judgment were intact [Affect] : the affect was normal [Memory Recent] : recent memory was not impaired

## 2020-12-22 NOTE — HISTORY OF PRESENT ILLNESS
[Home] : at home, [unfilled] , at the time of the visit. [Spouse] : spouse [Verbal consent obtained from patient] : the patient, [unfilled] [Other Location: e.g. Home (Enter Location, City,State)___] : at [unfilled] [FreeTextEntry1] : 65 year male with PMH of hypertension, HLD, and dilated aortic root presents for routine follow up. He states he is feeling well. He denies any symptoms of chest pain, dyspnea, orthopnea, palpitations, fatigue, edema, near syncope or syncope. He reports he is compliant with his medications. He does not routinely exercise and is able to climb multiple flights of stairs without CP or SOB. NST done 5/2018 showed no evidence of ischemia or infarction. Echo 12/18/2019 with normal LVEF 70%, SoV 3.9 cm, and mild plaque bilaterally noted on carotid US. He has retired and is caring for his wife who is S/P liver transplant.  He had an EGD with biopsy negative for Rodriguez's esophagus.  \par \par Time in 3:15  Time out 3:32 pm [FreeTextEntry4] : Danita Allen MA

## 2022-03-16 NOTE — PROGRESS NOTE ADULT - BACK
Problem: Mobility Impaired (Adult and Pediatric)  Goal: *Acute Goals and Plan of Care (Insert Text)  Outcome: Progressing Towards Goal  Note: DISCHARGE GOALS MODIFIED BASED ON PROGRESS 3/13/12 :  (1.)Mr. Coy Jerry will move from supine to sit and sit to supine with STAND BY ASSIST, flat bed no rail. (2.)Mr. Coy Jerry will transfer from bed to chair and chair to bed with CONTACT GUARD ASSIST using a walker. (3.)Mr. Coy Jerry will ambulate with CONTACT GUARD ASSIST 25-30 ft using a rolling walker   (4.)Mr. Coy Jerry will perform LE AROM exercises on cue without physical help. PHYSICAL THERAPY: Daily Note and PM 3/16/2022  INPATIENT: PT Visit Days : 4  [place] / [place] / Product Type: Medicare /       NAME/AGE/GENDER: Smitha Oneill is a 66 y.o. male   PRIMARY DIAGNOSIS: Weakness [R53.1] UTI (urinary tract infection) UTI (urinary tract infection)       ICD-10: Treatment Diagnosis:    · Generalized Muscle Weakness (M62.81)  · Difficulty in walking, Not elsewhere classified (R26.2)   Precaution/Allergies:  Penicillin g, Percocet [oxycodone-acetaminophen], and Sulfa (sulfonamide antibiotics)      ASSESSMENT:     Mr. Coy Jerry just transferred out of ICU to the floor. Needing lots of encouragement to get up with therapy-wanting a nap, wanting to eat breakfast (10:50 in the morning). He refused to get up yesterday so provided lots of encouragement. He didn't need physical assist for supine to sit but struggled to scoot to the edge of the bed. He pulled himself up to standing at the walker and stood with only CGA. No sudden buckling in standing. He took side steps along the bed, sat, and then stood again to take steps to the recliner. Seems to be doing better with mobility. Still needs SNF/STR at d/c as he lives alone. 3/16 supine upon arrival.  Needs to go to the restroom. Supine>eob with CGA and support with walker.   Ambulated 16 ft to the restroom and back to bed with verbal cues to slow down. Pt was getting up after the cord was pulled, with brief around his ankle. Therapist told pt he has to wait for someone to get here. Therapist ask pt if he would like to exercises he said No.  Therapist tried to encourage him and he still said no. Left in the bed with needs in reach, alarm on and instructed to call for assist, before getting up. This section established at most recent assessment   PROBLEM LIST (Impairments causing functional limitations):  1. Decreased Strength  2. Decreased ADL/Functional Activities  3. Decreased Transfer Abilities  4. Decreased Ambulation Ability/Technique  5. Decreased Balance  6. Decreased Activity Tolerance   INTERVENTIONS PLANNED: (Benefits and precautions of physical therapy have been discussed with the patient.)  1. Balance Exercise  2. Bed Mobility  3. Gait Training  4. Therapeutic Activites  5. Therapeutic Exercise/Strengthening  6. Transfer Training     TREATMENT PLAN: Frequency/Duration: daily for duration of hospital stay  Rehabilitation Potential For Stated Goals: Good     REHAB RECOMMENDATIONS (at time of discharge pending progress):    Placement: It is my opinion, based on this patient's performance to date, that Mr. Dick Vargas may benefit from intensive therapy at a [address] [place] [address] after discharge due to the functional deficits listed above that are likely to improve with skilled rehabilitation and concerns that he/she may be unsafe to be unsupervised at home due to weakness and debility, would be unsafe to be up without assist .  Equipment:    To be determined              HISTORY:   History of Present Injury/Illness (Reason for Referral):  Copied from MD note:   Ulysses Elena is a 66 y.o. 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  He lives alone, does have a sitter 8 hours/day, his daughter lives out of state and essentially not involved. EMS was called related to inability to ambulate at all today. Found in feces and urine. Patient knows he is at the hospital knows his name. He is unsure of the date or day or year. Patient usually gets around with a Rollator. Also history of stroke with some residual left-sided weakness. Urine in ED showed 10-20 WBC, he has poor hygiene. T max 99. Procal 0.05, no leukocytosis. CK ordered and pending. BC x2 and urine culture ordered. CT head shows chronic findings of right side lacunar infarct and supraclinoid artery. H/O AAA and iliac aneurysm that he is followed by Dr. Yanet Walter for. Past Medical History/Comorbidities:   Mr. Theodore Hernandez  has a past medical history of Alcoholic pancreatitis ([id number]), Aneurysm [facility]), Aneurysm artery, celiac ([facility] [address] (8/6/2014), Chronic pain, CVA (cerebral infarction) (1998, 2008), Depression, Diabetes ([address] (2010), Diabetic ulcer of left great toe ([address] Enlarged aorta ([facility] (8/6/2014), Gout, History of stroke (1998), Hypertension, Mixed hyperlipidemia, Obesity, Skin cancer, Stroke ([address] Unspecified constipation, and Unspecified vitamin D deficiency. He has no past medical history of Chronic kidney disease. Mr. Theodore Hernandez  has a past surgical history that includes hx tonsillectomy; hx adenoidectomy; pr appendectomy; hx orthopaedic (12/2012); hx orthopaedic (last 4/2013); hx orthopaedic (11/2013); hx cataract removal (OD-2013/OS-2014); hx urological (1980's); and hx colonoscopy (2007, 2010, 2014).   Social History/Living Environment:   Home Environment: Private residence  # Steps to Enter: 0  One/Two Story Residence: Two story, live on 1st floor  Living Alone: Yes  Support Systems: Caregiver/Home Care Staff,Friend/Neighbor  Patient Expects to be Discharged to[de-identified] Skilled nursing facility  Current DME Used/Available at Home: meli Olivo  Prior Level of Function/Work/Activity:  Pt lives at home alone, getting weaker at home, used a rollator     Number of Personal Factors/Comorbidities that affect the Plan of Care: 0: LOW COMPLEXITY   EXAMINATION:   Most Recent Physical Functioning:   Gross Assessment:                  Posture:     Balance:  Sitting: Intact  Standing: Pull to stand; With support Bed Mobility:  Supine to Sit: Contact guard assistance  Sit to Supine: Contact guard assistance  Scooting: Minimum assistance; Additional time  Level of Assistance: Minimum assistance  Wheelchair Mobility:     Transfers:  Sit to Stand: Contact guard assistance  Stand to Sit: Contact guard assistance  Bed to Chair: Contact guard assistance  Duration: 23 Minutes  Gait:     Speed/Lulú: Slow  Step Length: Left shortened;Right shortened  Gait Abnormalities: Decreased step clearance  Distance (ft): 16 Feet (ft)  Assistive Device: Walker, rolling  Ambulation - Level of Assistance: Contact guard assistance  Interventions: Safety awareness training;Verbal cues         Body Structures Involved:  1. Muscles Body Functions Affected:  1. Movement Related Activities and Participation Affected:  1. Mobility  2. Self Care   Number of elements that affect the Plan of Care: 4+: HIGH COMPLEXITY   CLINICAL PRESENTATION:   Presentation: Stable and uncomplicated: LOW COMPLEXITY   CLINICAL DECISION MAKIN[address] [place], [place] [id number] AM-PAC 6 Clicks   Basic Mobility Inpatient Short Form  How much difficulty does the patient currently have. .. Unable A Lot A Little None   1. Turning over in bed (including adjusting bedclothes, sheets and blankets)? [] 1   [] 2   [x] 3   [] 4   2. Sitting down on and standing up from a chair with arms ( e.g., wheelchair, bedside commode, etc.)   [] 1   [x] 2   [] 3   [] 4   3. Moving from lying on back to sitting on the side of the bed? [] 1   [] 2   [x] 3   [] 4   How much help from another person does the patient currently need. .. Total A Lot A Little None   4.   Moving to and from a bed to a chair (including a wheelchair)? [] 1   [x] 2   [] 3   [] 4   5. Need to walk in hospital room? [] 1   [x] 2   [] 3   [] 4   6. Climbing 3-5 steps with a railing? [] 1   [x] 2   [] 3   [] 4   © 2007, Trustees of [address] [place] [address] [address] [id number], under license to [employer]. All rights reserved      Score:  Initial: 14 Most Recent: X (Date: -- )    Interpretation of Tool:  Represents activities that are increasingly more difficult (i.e. Bed mobility, Transfers, Gait). Medical Necessity:     · Patient is expected to demonstrate progress in   · strength, balance, coordination, and functional technique  ·  to   · decrease assistance required with functional mobility  · . Reason for Services/Other Comments:  · Patient continues to require skilled intervention due to   · Inability to complete functional mobility independently  · . Use of outcome tool(s) and clinical judgement create a POC that gives a: Questionable prediction of patient's progress: MODERATE COMPLEXITY            TREATMENT:   (In addition to Assessment/Re-Assessment sessions the following treatments were rendered)   Pre-treatment Symptoms/Complaints:  Patient wanting breakfast and to nap. Pain: Initial: numeric scale     Post Session:  4/10   Therapeutic Activity: (  23 Minutes ):  Therapeutic activities including bed mobility, sitting edge of bed, sit <> stand transitions at walker, standing balance, ambulation with RW, and chair transfer to improve mobility, strength, balance and coordination. Date:  3/14 B UE/LE Date:   Date:     Activity/Exercise Parameters Parameters Parameters   Ankle pumps 10     Quad sets 10     Hip abd/add 10     heelslides 10     Shoulder flex/ext 10     SAQ 10     Elbow flex/ext 10       Braces/Orthotics/Lines/Etc:   · IV  Treatment/Session Assessment:    · Response to Treatment: Patient needing lots of encouragement-still needs motivation.   · Interdisciplinary Collaboration:   o Physical Therapy Assistant  o Registered Nurse  · After treatment position/precautions:   o Supine in bed  o Bed/Chair-wheels locked  o Call light within reach   · Compliance with Program/Exercises: Will assess as treatment progresses  · Recommendations/Intent for next treatment session: \"Next visit will focus on advancements to more challenging activities and reduction in assistance provided\".   Total Treatment Duration:  PT Patient Time In/Time Out  Time In: 1200  Time Out: [address] [place] [address], PTA No deformity or limitation of movement

## 2022-11-08 NOTE — ED PROVIDER NOTE - ABDOMINAL TENDER
ambulate 150 feet with RW independently in 3-5 sessions epigastric/right upper quadrant 2-4 sessions: pt will amb 75ft with rolling walker and cgA1

## 2023-06-13 NOTE — PATIENT PROFILE ADULT. - TEACHING/LEARNING FACTORS INFLUENCE READINESS TO LEARN
Pre-Procedure Local Anesthetic Only     PROCEDURAL ASSESSMENT    • Procedure date: 6/13/2023  • Preop Diagnosis/Indications for Procedure:  Cervical spondylosis  • Planned Procedure: Medial Branch Block, Right, Cervical, C3, C4 and C5, Second Diagnostic Block  • Planned Anesthesia: Local    Informed Consent was obtained, patient or responsible party denies additional questions regarding procedure.    MEDICAL HISTORY / COMORBID CONDITIONS    • Medical/ Surgical History Reviewed: Yes   • Normal Mental Status: Yes    Constitutional: No acute distress     OTHER FINDINGS:  Visit Vitals  /82 (BP Location: LUE - Left upper extremity, Patient Position: Sitting)   Pulse 83   Resp 18   Ht 6' 2\" (1.88 m)   Wt 104.3 kg (230 lb)   SpO2 97%   BMI 29.53 kg/m²       Reviewed  Current Medications and Allergies:  Yes  • Patient asked to hold any blood thinning medications for procedure: No  • History of any bleeding disorders: no    Reviewed pertinent lab/diagnostic tests:   None           Assessing Provider: RODOLFO Ziegler  Date: 6/13/2023  Time: 3:03 PM    
none

## 2024-01-29 NOTE — ED ADULT NURSE NOTE - CARDIO WDL
Products Recommended: La Roche-Posay and Elta MD
General Sunscreen Counseling: I recommended a broad spectrum sunscreen with a SPF of 30 or higher.  I explained that SPF 30 sunscreens block approximately 97 percent of the sun's harmful rays.  Sunscreens should be applied at least 15 minutes prior to expected sun exposure and then every 2 hours after that as long as sun exposure continues. If swimming or exercising sunscreen should be reapplied every 45 minutes to an hour after getting wet or sweating.  One ounce, or the equivalent of a shot glass full of sunscreen, is adequate to protect the skin not covered by a bathing suit. I also recommended a lip balm with a sunscreen as well. Sun protective clothing can be used in lieu of sunscreen but must be worn the entire time you are exposed to the sun's rays.
Detail Level: Zone
Normal rate, regular rhythm, normal S1, S2 heart sounds heard.

## 2024-03-20 NOTE — ED STATDOCS - NS ED NOTE AC HIGH RISK COUNTRIES
Pt arrives to ED via EMS from [facility], per EMS pt is on IV antibiotics for cellulitis and pulled out his IV today, refusing to take IV antibiotics, is confused and has been hallucinating. Pt reports he will take oral antibiotics, not IV due to IV hurting. Pt denies AH/VH. Pt arrives to ED with gauze dressing in place to LLE with yellowish discharge on gauze, pt reports getting toes ambulated \"recently\"..  
No